# Patient Record
Sex: FEMALE | Race: BLACK OR AFRICAN AMERICAN | Employment: STUDENT | URBAN - METROPOLITAN AREA
[De-identification: names, ages, dates, MRNs, and addresses within clinical notes are randomized per-mention and may not be internally consistent; named-entity substitution may affect disease eponyms.]

---

## 2018-05-21 ENCOUNTER — HOSPITAL ENCOUNTER (OUTPATIENT)
Dept: GENERAL RADIOLOGY | Age: 23
Discharge: HOME | End: 2018-05-21
Payer: MEDICAID

## 2018-05-21 DIAGNOSIS — M25.562 LEFT KNEE PAIN: ICD-10-CM

## 2018-05-21 DIAGNOSIS — M25.561 RIGHT KNEE PAIN: ICD-10-CM

## 2018-05-21 PROCEDURE — 73560 X-RAY EXAM OF KNEE 1 OR 2: CPT

## 2021-07-09 ENCOUNTER — VIRTUAL VISIT (OUTPATIENT)
Dept: NEUROLOGY | Age: 26
End: 2021-07-09
Payer: MEDICAID

## 2021-07-09 DIAGNOSIS — R56.9 SEIZURE-LIKE ACTIVITY (HCC): ICD-10-CM

## 2021-07-09 DIAGNOSIS — R40.20 LOC (LOSS OF CONSCIOUSNESS) (HCC): ICD-10-CM

## 2021-07-09 DIAGNOSIS — Z91.89 AT HIGH RISK FOR INADEQUATE NUTRITIONAL INTAKE: ICD-10-CM

## 2021-07-09 DIAGNOSIS — R40.20 LOC (LOSS OF CONSCIOUSNESS) (HCC): Primary | ICD-10-CM

## 2021-07-09 PROCEDURE — 99204 OFFICE O/P NEW MOD 45 MIN: CPT | Performed by: NURSE PRACTITIONER

## 2021-07-09 NOTE — PROGRESS NOTES
Early Fox presents today for   Chief Complaint   Patient presents with    Dizziness       Is someone accompanying this pt? Virtual visit [phone]    Is the patient using any DME equipment during OV? no    Depression Screening:  No flowsheet data found. Learning Assessment:  No flowsheet data found. Abuse Screening:  No flowsheet data found. Fall Risk  No flowsheet data found. Coordination of Care:  1. Have you been to the ER, urgent care clinic since your last visit? Hospitalized since your last visit? no    2. Have you seen or consulted any other health care providers outside of the [address] [place] [address] since your last visit? Include any pap smears or colon screening.  no
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
## 2021-07-12 ENCOUNTER — TELEPHONE (OUTPATIENT)
Dept: NEUROLOGY | Age: 26
End: 2021-07-12

## 2021-07-13 NOTE — TELEPHONE ENCOUNTER
----- Message from Dwayne Chandler NP sent at 7/13/2021  9:16 AM EDT -----  Regarding: awaiting testing  I spoke to the neurologist and confirmed the plan that she must have her MRI and EEG complete in order to consider clearing her for sports. Thank you.

## 2021-07-13 NOTE — TELEPHONE ENCOUNTER
Attempted to contact patient regarding patient to complete testing prior clearing for sports. No answer. Left message to .

## 2021-07-20 ENCOUNTER — OFFICE VISIT (OUTPATIENT)
Dept: NEUROLOGY | Age: 26
End: 2021-07-20

## 2023-07-31 ENCOUNTER — APPOINTMENT (OUTPATIENT)
Facility: HOSPITAL | Age: 28
End: 2023-07-31
Payer: COMMERCIAL

## 2023-07-31 ENCOUNTER — HOSPITAL ENCOUNTER (EMERGENCY)
Facility: HOSPITAL | Age: 28
Discharge: HOME | End: 2023-07-31
Attending: STUDENT IN AN ORGANIZED HEALTH CARE EDUCATION/TRAINING PROGRAM
Payer: COMMERCIAL

## 2023-07-31 ENCOUNTER — TELEPHONE (OUTPATIENT)
Age: 28
End: 2023-07-31

## 2023-07-31 VITALS
DIASTOLIC BLOOD PRESSURE: 66 MMHG | TEMPERATURE: 98.3 F | RESPIRATION RATE: 18 BRPM | SYSTOLIC BLOOD PRESSURE: 118 MMHG | WEIGHT: 200 LBS | BODY MASS INDEX: 27.09 KG/M2 | HEIGHT: 72 IN | OXYGEN SATURATION: 100 % | HEART RATE: 57 BPM

## 2023-07-31 DIAGNOSIS — M79.604 RIGHT LEG PAIN: Primary | ICD-10-CM

## 2023-07-31 LAB
ECHO BSA: 2.23 M2
GLUCOSE BLD STRIP.AUTO-MCNC: 96 MG/DL (ref 70–110)

## 2023-07-31 PROCEDURE — 73630 X-RAY EXAM OF FOOT: CPT

## 2023-07-31 PROCEDURE — 99284 EMERGENCY DEPT VISIT MOD MDM: CPT

## 2023-07-31 PROCEDURE — 93971 EXTREMITY STUDY: CPT

## 2023-07-31 PROCEDURE — 82962 GLUCOSE BLOOD TEST: CPT

## 2023-07-31 ASSESSMENT — PAIN DESCRIPTION - ORIENTATION: ORIENTATION: RIGHT

## 2023-07-31 ASSESSMENT — ENCOUNTER SYMPTOMS
DIARRHEA: 0
SORE THROAT: 0
SHORTNESS OF BREATH: 0
NAUSEA: 0
EYE DISCHARGE: 0
VOMITING: 0
WHEEZING: 0
ABDOMINAL DISTENTION: 0

## 2023-07-31 ASSESSMENT — PAIN DESCRIPTION - FREQUENCY: FREQUENCY: INTERMITTENT

## 2023-07-31 ASSESSMENT — PAIN DESCRIPTION - DESCRIPTORS: DESCRIPTORS: ACHING

## 2023-07-31 ASSESSMENT — PAIN DESCRIPTION - PAIN TYPE: TYPE: ACUTE PAIN

## 2023-07-31 ASSESSMENT — PAIN - FUNCTIONAL ASSESSMENT: PAIN_FUNCTIONAL_ASSESSMENT: 0-10

## 2023-07-31 ASSESSMENT — PAIN DESCRIPTION - LOCATION: LOCATION: LEG

## 2023-07-31 NOTE — ED PROVIDER NOTES
EMERGENCY DEPARTMENT HISTORY AND PHYSICAL EXAM    Date: 7/31/2023  Patient Name: Andriy Morocho    History of Presenting Illness     Chief Complaint   Patient presents with    Leg Pain    Foot Swelling         History Provided By: Patient and mother      Additional History (Context): Andriy Morocho is a 24 y.o. female with a history of asthma who presents today for right lower extremity pain and swelling as well as foot pain and swelling. Patient denies known cause for this. Denies any recent trauma or injury. 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 Denies history of DVT or PE. Patient also reports that her primary care doctor said she was at risk for diabetes and she is wondering if her swelling could be from this. Is not currently on any diabetes treatment at this time. Denies any erythema or wounds. Denies any prior injuries or surgeries to this leg or foot      PCP: None None    No current facility-administered medications for this encounter. No current outpatient medications on file. Past History     Past Medical History:  Past Medical History:   Diagnosis Date    Asthma        Past Surgical History:  No past surgical history on file. Family History:  No family history on file. Social History:  Social History     Tobacco Use    Smoking status: Never    Smokeless tobacco: Never   Substance Use Topics    Alcohol use: No    Drug use: No       Allergies:  No Known Allergies      Review of Systems   Review of Systems   Constitutional:  Negative for chills, diaphoresis and fever. HENT:  Negative for congestion and sore throat. Eyes:  Negative for discharge. Respiratory:  Negative for shortness of breath and wheezing. Cardiovascular:  Negative for chest pain. Gastrointestinal:  Negative for abdominal distention, diarrhea, nausea and vomiting. Genitourinary:  Negative for dysuria and urgency.    Musculoskeletal:  Positive for

## 2023-07-31 NOTE — ED TRIAGE NOTES
Patient A/O x 4, presented to the ED with complaint of right leg pain, foot swelling x today. Patient denies trauma.

## 2023-07-31 NOTE — TELEPHONE ENCOUNTER
Patient was seen at [facility] ED today for rt leg and right foot pain/swelling. They did xrays and vascular and advised her to follow up with us. Please review ED notes and advise patient of our recommendations for appointment, [phone].

## 2023-10-26 ENCOUNTER — HOSPITAL ENCOUNTER (EMERGENCY)
Facility: HOSPITAL | Age: 28
Discharge: HOME | End: 2023-10-26
Attending: EMERGENCY MEDICINE
Payer: COMMERCIAL

## 2023-10-26 ENCOUNTER — APPOINTMENT (OUTPATIENT)
Facility: HOSPITAL | Age: 28
End: 2023-10-26
Payer: COMMERCIAL

## 2023-10-26 VITALS
HEART RATE: 58 BPM | OXYGEN SATURATION: 99 % | RESPIRATION RATE: 20 BRPM | SYSTOLIC BLOOD PRESSURE: 112 MMHG | BODY MASS INDEX: 27.77 KG/M2 | DIASTOLIC BLOOD PRESSURE: 71 MMHG | WEIGHT: 205 LBS | TEMPERATURE: 99.1 F | HEIGHT: 72 IN
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
## 2023-10-26 DIAGNOSIS — S86.911A KNEE STRAIN, RIGHT, INITIAL ENCOUNTER: Primary | ICD-10-CM

## 2023-10-26 DIAGNOSIS — S93.601A SPRAIN OF RIGHT FOOT, INITIAL ENCOUNTER: ICD-10-CM

## 2023-10-26 PROCEDURE — 73620 X-RAY EXAM OF FOOT: CPT

## 2023-10-26 PROCEDURE — 99283 EMERGENCY DEPT VISIT LOW MDM: CPT

## 2023-10-26 PROCEDURE — 73562 X-RAY EXAM OF KNEE 3: CPT

## 2023-10-26 RX ORDER — NAPROXEN 500 MG/1
500 TABLET ORAL 2 TIMES DAILY
Qty: 60 TABLET | Refills: 0 | Status: SHIPPED | OUTPATIENT
Start: 2023-10-26

## 2023-10-26 ASSESSMENT — ENCOUNTER SYMPTOMS
SORE THROAT: 0
VOMITING: 0
EYE DISCHARGE: 0
RHINORRHEA: 0
NAUSEA: 0
ABDOMINAL PAIN: 0
CONSTIPATION: 0
EYE REDNESS: 0
SHORTNESS OF BREATH: 0
BACK PAIN: 0
DIARRHEA: 0
COUGH: 0
WHEEZING: 0

## 2023-10-26 ASSESSMENT — PAIN SCALES - GENERAL: PAINLEVEL_OUTOF10: 8

## 2023-10-26 ASSESSMENT — PAIN - FUNCTIONAL ASSESSMENT: PAIN_FUNCTIONAL_ASSESSMENT: 0-10

## 2023-10-26 NOTE — ED TRIAGE NOTES
Pt states she messed up her right knee playing basketball 3 weeks ago. Also c/o right foot pain for 2 months. Has been seen her before for same.  Pt states she wants an MRI

## 2023-10-26 NOTE — ED PROVIDER NOTES
assessment completed. REASSESSMENT        CONSULTS:  None    DISCHARGE NOTE:  6:16 PM  Katherine Hendrix's  results have been reviewed with her. She has been counseled regarding her diagnosis, treatment, and plan. She verbally conveys understanding and agreement of the signs, symptoms, diagnosis, treatment and prognosis and additionally agrees to follow up as discussed. She also agrees with the care-plan and conveys that all of her questions have been answered. I have also provided discharge instructions for her that include: educational information regarding their diagnosis and treatment, and list of reasons why they would want to return to the ED prior to their follow-up appointment, should her condition change. FINAL IMPRESSION      1. Knee strain, right, initial encounter    2. Sprain of right foot, initial encounter    3. Fall on same level, initial encounter            DISPOSITION/PLAN   DISPOSITION Decision To Discharge 10/26/2023 06:14:37 PM       Medication List        START taking these medications      naproxen 500 MG tablet  Commonly known as: Naprosyn  Take 1 tablet by mouth 2 times daily            ASK your doctor about these medications      NONFORMULARY               Where to Get Your Medications        These medications were sent to [facility] [id number], [address] [place] [address], [address] [place] [address],Third Floor      Phone: [phone]   naproxen 500 MG tablet          PATIENT REFERRED TO:  [address]  [address]  [phone]  Go in 1 week  As needed    Chacorta Murillo MD  [address]  [phone]    Go in 2 weeks  If no improvement.       DISCHARGE MEDICATIONS:  Discharge Medication List as of 10/26/2023  6:16 PM        START taking these medications    Details   naproxen (NAPROSYN) 500 MG tablet Take 1 tablet by mouth 2 times daily,

## 2023-10-30 ENCOUNTER — OFFICE VISIT (OUTPATIENT)
Age: 28
End: 2023-10-30
Payer: COMMERCIAL

## 2023-10-30 VITALS — BODY MASS INDEX: 27.77 KG/M2 | WEIGHT: 205 LBS | HEIGHT: 72 IN

## 2023-10-30 DIAGNOSIS — S86.911A KNEE STRAIN, RIGHT, INITIAL ENCOUNTER: ICD-10-CM

## 2023-10-30 DIAGNOSIS — S83.8X1A SPRAIN OF OTHER LIGAMENT OF RIGHT KNEE, INITIAL ENCOUNTER: Primary | ICD-10-CM

## 2023-10-30 DIAGNOSIS — M79.671 RIGHT FOOT PAIN: ICD-10-CM

## 2023-10-30 PROCEDURE — 99203 OFFICE O/P NEW LOW 30 MIN: CPT | Performed by: ORTHOPAEDIC SURGERY

## 2023-10-30 SDOH — HEALTH STABILITY: PHYSICAL HEALTH: ON AVERAGE, HOW MANY DAYS PER WEEK DO YOU ENGAGE IN MODERATE TO STRENUOUS EXERCISE (LIKE A BRISK WALK)?: 6 DAYS

## 2023-10-30 NOTE — PATIENT INSTRUCTIONS
If we order a Diagnostic test (such as MRI or CT) during your office visit please see below:     Coordination of Care will be calling you to schedule your diagnostic test. If you have not heard from Coordination of Care within 2 business days, please call [phone]. Once you have a date scheduled for your diagnostic test, you will need to contact our office to schedule a follow up appointment about 4 days following the exam, as this is when the physician will review your diagnostic test results with you. You can contact our office to schedule appointment by phone at [phone], or you can send a message via [facility] to request an appointment.

## 2025-03-17 ENCOUNTER — APPOINTMENT (OUTPATIENT)
Facility: HOSPITAL | Age: 30
End: 2025-03-17
Payer: COMMERCIAL

## 2025-03-17 ENCOUNTER — HOSPITAL ENCOUNTER (INPATIENT)
Facility: HOSPITAL | Age: 30
LOS: 4 days | Discharge: HOME | End: 2025-03-21
Attending: EMERGENCY MEDICINE | Admitting: INTERNAL MEDICINE
Payer: COMMERCIAL

## 2025-03-17 ENCOUNTER — PROCEDURE VISIT (OUTPATIENT)
Age: 30
End: 2025-03-17

## 2025-03-17 DIAGNOSIS — R56.9 SEIZURE-LIKE ACTIVITY (HCC): Primary | ICD-10-CM

## 2025-03-17 DIAGNOSIS — F44.5 PSYCHOSOMATIC SEIZURE: Primary | ICD-10-CM

## 2025-03-17 PROBLEM — G40.901 STATUS EPILEPTICUS (HCC): Status: ACTIVE | Noted: 2025-03-17

## 2025-03-17 LAB
ALBUMIN SERPL-MCNC: 3.7 G/DL (ref 3.4–5)
ALBUMIN/GLOB SERPL: 0.9 (ref 0.8–1.7)
ALP SERPL-CCNC: 83 U/L (ref 45–117)
ALT SERPL-CCNC: 15 U/L (ref 13–56)
ANION GAP SERPL CALC-SCNC: 6 MMOL/L (ref 3–18)
APPEARANCE UR: CLEAR
ARTERIAL PATENCY WRIST A: POSITIVE
AST SERPL-CCNC: 23 U/L (ref 10–38)
BASE DEFICIT BLD-SCNC: 1.9 MMOL/L
BASOPHILS # BLD: 0.03 K/UL (ref 0–0.1)
BASOPHILS NFR BLD: 0.6 % (ref 0–2)
BDY SITE: ABNORMAL
BILIRUB SERPL-MCNC: 0.7 MG/DL (ref 0.2–1)
BILIRUB UR QL: NEGATIVE
BUN SERPL-MCNC: 11 MG/DL (ref 7–18)
BUN/CREAT SERPL: 11 (ref 12–20)
CALCIUM SERPL-MCNC: 9.3 MG/DL (ref 8.5–10.1)
CHLORIDE SERPL-SCNC: 106 MMOL/L (ref 100–111)
CO2 SERPL-SCNC: 25 MMOL/L (ref 21–32)
COLOR UR: YELLOW
CREAT SERPL-MCNC: 1.02 MG/DL (ref 0.6–1.3)
DIFFERENTIAL METHOD BLD: ABNORMAL
EOSINOPHIL # BLD: 0.07 K/UL (ref 0–0.4)
EOSINOPHIL NFR BLD: 1.4 % (ref 0–5)
ERYTHROCYTE [DISTWIDTH] IN BLOOD BY AUTOMATED COUNT: 14.4 % (ref 11.6–14.5)
GAS FLOW.O2 O2 DELIVERY SYS: ABNORMAL
GAS FLOW.O2 SETTING OXYMISER: 14 BPM
GLOBULIN SER CALC-MCNC: 3.9 G/DL (ref 2–4)
GLUCOSE SERPL-MCNC: 88 MG/DL (ref 74–99)
GLUCOSE UR STRIP.AUTO-MCNC: NEGATIVE MG/DL
HCG SERPL-ACNC: <1 MIU/ML (ref 0–10)
HCO3 BLD-SCNC: 24.1 MMOL/L (ref 21–28)
HCT VFR BLD AUTO: 35.2 % (ref 35–45)
HGB BLD-MCNC: 11.5 G/DL (ref 12–16)
HGB UR QL STRIP: NEGATIVE
IMM GRANULOCYTES # BLD AUTO: 0.01 K/UL (ref 0–0.04)
IMM GRANULOCYTES NFR BLD AUTO: 0.2 % (ref 0–0.5)
KETONES UR QL STRIP.AUTO: NEGATIVE MG/DL
LACTATE BLD-SCNC: 0.85 MMOL/L (ref 0.4–2)
LEUKOCYTE ESTERASE UR QL STRIP.AUTO: NEGATIVE
LYMPHOCYTES # BLD: 1.35 K/UL (ref 0.9–3.6)
LYMPHOCYTES NFR BLD: 27.9 % (ref 21–52)
MCH RBC QN AUTO: 26 PG (ref 24–34)
MCHC RBC AUTO-ENTMCNC: 32.7 G/DL (ref 31–37)
MCV RBC AUTO: 79.6 FL (ref 78–100)
MONOCYTES # BLD: 0.44 K/UL (ref 0.05–1.2)
MONOCYTES NFR BLD: 9.1 % (ref 3–10)
NEUTS SEG # BLD: 2.94 K/UL (ref 1.8–8)
NEUTS SEG NFR BLD: 60.8 % (ref 40–73)
NITRITE UR QL STRIP.AUTO: NEGATIVE
NRBC # BLD: 0 K/UL (ref 0–0.01)
NRBC BLD-RTO: 0 PER 100 WBC
O2/TOTAL GAS SETTING VFR VENT: 50 %
PCO2 BLD: 44.5 MMHG (ref 35–48)
PEEP RESPIRATORY: 5 CMH2O
PH BLD: 7.34 (ref 7.35–7.45)
PH UR STRIP: 6.5 (ref 5–8)
PLATELET # BLD AUTO: 279 K/UL (ref 135–420)
PMV BLD AUTO: 10 FL (ref 9.2–11.8)
PO2 BLD: 214 MMHG (ref 83–108)
POTASSIUM SERPL-SCNC: 4.1 MMOL/L (ref 3.5–5.5)
PROT SERPL-MCNC: 7.6 G/DL (ref 6.4–8.2)
PROT UR STRIP-MCNC: NEGATIVE MG/DL
RBC # BLD AUTO: 4.42 M/UL (ref 4.2–5.3)
RESPIRATORY RATE, POC: 14 (ref 5–40)
SAO2 % BLD: 99.7 % (ref 92–97)
SERVICE CMNT-IMP: ABNORMAL
SODIUM SERPL-SCNC: 137 MMOL/L (ref 136–145)
SP GR UR REFRACTOMETRY: 1.02 (ref 1–1.03)
SPECIMEN TYPE: ABNORMAL
UROBILINOGEN UR QL STRIP.AUTO: 0.2 EU/DL (ref 0.2–1)
VENTILATION MODE VENT: ABNORMAL
VT SETTING VENT: 450 ML
WBC # BLD AUTO: 4.8 K/UL (ref 4.6–13.2)

## 2025-03-17 PROCEDURE — 6360000004 HC RX CONTRAST MEDICATION: Performed by: EMERGENCY MEDICINE

## 2025-03-17 PROCEDURE — 87086 URINE CULTURE/COLONY COUNT: CPT

## 2025-03-17 PROCEDURE — 84100 ASSAY OF PHOSPHORUS: CPT

## 2025-03-17 PROCEDURE — 94761 N-INVAS EAR/PLS OXIMETRY MLT: CPT

## 2025-03-17 PROCEDURE — 5A1935Z RESPIRATORY VENTILATION, LESS THAN 24 CONSECUTIVE HOURS: ICD-10-PCS | Performed by: EMERGENCY MEDICINE

## 2025-03-17 PROCEDURE — 2720000010 HC SURG SUPPLY STERILE

## 2025-03-17 PROCEDURE — 6360000002 HC RX W HCPCS

## 2025-03-17 PROCEDURE — 84443 ASSAY THYROID STIM HORMONE: CPT

## 2025-03-17 PROCEDURE — 80307 DRUG TEST PRSMV CHEM ANLYZR: CPT

## 2025-03-17 PROCEDURE — 83605 ASSAY OF LACTIC ACID: CPT

## 2025-03-17 PROCEDURE — 0202U NFCT DS 22 TRGT SARS-COV-2: CPT

## 2025-03-17 PROCEDURE — 80179 DRUG ASSAY SALICYLATE: CPT

## 2025-03-17 PROCEDURE — 2700000000 HC OXYGEN THERAPY PER DAY

## 2025-03-17 PROCEDURE — 96376 TX/PRO/DX INJ SAME DRUG ADON: CPT

## 2025-03-17 PROCEDURE — 84702 CHORIONIC GONADOTROPIN TEST: CPT

## 2025-03-17 PROCEDURE — 0BH17EZ INSERTION OF ENDOTRACHEAL AIRWAY INTO TRACHEA, VIA NATURAL OR ARTIFICIAL OPENING: ICD-10-PCS | Performed by: EMERGENCY MEDICINE

## 2025-03-17 PROCEDURE — 80053 COMPREHEN METABOLIC PANEL: CPT

## 2025-03-17 PROCEDURE — 84145 PROCALCITONIN (PCT): CPT

## 2025-03-17 PROCEDURE — 93005 ELECTROCARDIOGRAM TRACING: CPT | Performed by: STUDENT IN AN ORGANIZED HEALTH CARE EDUCATION/TRAINING PROGRAM

## 2025-03-17 PROCEDURE — 85025 COMPLETE CBC W/AUTO DIFF WBC: CPT

## 2025-03-17 PROCEDURE — 70496 CT ANGIOGRAPHY HEAD: CPT

## 2025-03-17 PROCEDURE — 71045 X-RAY EXAM CHEST 1 VIEW: CPT

## 2025-03-17 PROCEDURE — 1100000000 HC RM PRIVATE

## 2025-03-17 PROCEDURE — 51702 INSERT TEMP BLADDER CATH: CPT

## 2025-03-17 PROCEDURE — 31500 INSERT EMERGENCY AIRWAY: CPT

## 2025-03-17 PROCEDURE — 96374 THER/PROPH/DIAG INJ IV PUSH: CPT

## 2025-03-17 PROCEDURE — 36600 WITHDRAWAL OF ARTERIAL BLOOD: CPT

## 2025-03-17 PROCEDURE — 82077 ASSAY SPEC XCP UR&BREATH IA: CPT

## 2025-03-17 PROCEDURE — 81003 URINALYSIS AUTO W/O SCOPE: CPT

## 2025-03-17 PROCEDURE — 2500000003 HC RX 250 WO HCPCS: Performed by: EMERGENCY MEDICINE

## 2025-03-17 PROCEDURE — 70450 CT HEAD/BRAIN W/O DYE: CPT

## 2025-03-17 PROCEDURE — 82550 ASSAY OF CK (CPK): CPT

## 2025-03-17 PROCEDURE — 82803 BLOOD GASES ANY COMBINATION: CPT

## 2025-03-17 PROCEDURE — 80143 DRUG ASSAY ACETAMINOPHEN: CPT

## 2025-03-17 PROCEDURE — 99292 CRITICAL CARE ADDL 30 MIN: CPT

## 2025-03-17 PROCEDURE — 80320 DRUG SCREEN QUANTALCOHOLS: CPT

## 2025-03-17 PROCEDURE — 95706 EEG WO VID 2-12HR INTMT MNTR: CPT

## 2025-03-17 PROCEDURE — 87040 BLOOD CULTURE FOR BACTERIA: CPT

## 2025-03-17 PROCEDURE — 84439 ASSAY OF FREE THYROXINE: CPT

## 2025-03-17 PROCEDURE — 96375 TX/PRO/DX INJ NEW DRUG ADDON: CPT

## 2025-03-17 PROCEDURE — 83735 ASSAY OF MAGNESIUM: CPT

## 2025-03-17 PROCEDURE — 6360000002 HC RX W HCPCS: Performed by: EMERGENCY MEDICINE

## 2025-03-17 PROCEDURE — 99291 CRITICAL CARE FIRST HOUR: CPT

## 2025-03-17 PROCEDURE — 94002 VENT MGMT INPAT INIT DAY: CPT

## 2025-03-17 RX ORDER — POTASSIUM CHLORIDE 29.8 MG/ML
20 INJECTION INTRAVENOUS PRN
Status: DISCONTINUED | OUTPATIENT
Start: 2025-03-17 | End: 2025-03-21 | Stop reason: HOSPADM

## 2025-03-17 RX ORDER — SODIUM CHLORIDE 0.9 % (FLUSH) 0.9 %
5-40 SYRINGE (ML) INJECTION EVERY 12 HOURS SCHEDULED
Status: DISCONTINUED | OUTPATIENT
Start: 2025-03-18 | End: 2025-03-21 | Stop reason: HOSPADM

## 2025-03-17 RX ORDER — IOPAMIDOL 755 MG/ML
100 INJECTION, SOLUTION INTRAVASCULAR
Status: COMPLETED | OUTPATIENT
Start: 2025-03-17 | End: 2025-03-17

## 2025-03-17 RX ORDER — ENOXAPARIN SODIUM 100 MG/ML
40 INJECTION SUBCUTANEOUS DAILY
Status: DISCONTINUED | OUTPATIENT
Start: 2025-03-18 | End: 2025-03-21 | Stop reason: HOSPADM

## 2025-03-17 RX ORDER — MINERAL OIL AND WHITE PETROLATUM 150; 830 MG/G; MG/G
OINTMENT OPHTHALMIC EVERY 4 HOURS
Status: DISCONTINUED | OUTPATIENT
Start: 2025-03-18 | End: 2025-03-17

## 2025-03-17 RX ORDER — SODIUM CHLORIDE 0.9 % (FLUSH) 0.9 %
5-40 SYRINGE (ML) INJECTION PRN
Status: DISCONTINUED | OUTPATIENT
Start: 2025-03-17 | End: 2025-03-21 | Stop reason: HOSPADM

## 2025-03-17 RX ORDER — DIAZEPAM 10 MG/2ML
INJECTION, SOLUTION INTRAMUSCULAR; INTRAVENOUS
Status: COMPLETED
Start: 2025-03-17 | End: 2025-03-17

## 2025-03-17 RX ORDER — PROPOFOL 10 MG/ML
5-50 INJECTION, EMULSION INTRAVENOUS CONTINUOUS
OUTPATIENT
Start: 2025-03-18

## 2025-03-17 RX ORDER — POLYETHYLENE GLYCOL 3350 17 G/17G
17 POWDER, FOR SOLUTION ORAL DAILY PRN
Status: DISCONTINUED | OUTPATIENT
Start: 2025-03-17 | End: 2025-03-21 | Stop reason: HOSPADM

## 2025-03-17 RX ORDER — MAGNESIUM SULFATE IN WATER 40 MG/ML
2000 INJECTION, SOLUTION INTRAVENOUS PRN
Status: DISCONTINUED | OUTPATIENT
Start: 2025-03-17 | End: 2025-03-21 | Stop reason: HOSPADM

## 2025-03-17 RX ORDER — MIDAZOLAM IN NACL,ISO-OSMOT/PF 50 MG/50ML
1-10 INFUSION BOTTLE (ML) INTRAVENOUS CONTINUOUS
Status: DISCONTINUED | OUTPATIENT
Start: 2025-03-18 | End: 2025-03-18

## 2025-03-17 RX ORDER — FENTANYL CITRATE-0.9 % NACL/PF 10 MCG/ML
25-200 PLASTIC BAG, INJECTION (ML) INTRAVENOUS CONTINUOUS
Refills: 0 | Status: DISCONTINUED | OUTPATIENT
Start: 2025-03-17 | End: 2025-03-17

## 2025-03-17 RX ORDER — ROCURONIUM BROMIDE 10 MG/ML
100 INJECTION, SOLUTION INTRAVENOUS
Status: COMPLETED | OUTPATIENT
Start: 2025-03-17 | End: 2025-03-17

## 2025-03-17 RX ORDER — ONDANSETRON 4 MG/1
4 TABLET, ORALLY DISINTEGRATING ORAL EVERY 8 HOURS PRN
Status: DISCONTINUED | OUTPATIENT
Start: 2025-03-17 | End: 2025-03-21 | Stop reason: HOSPADM

## 2025-03-17 RX ORDER — CHLORHEXIDINE GLUCONATE ORAL RINSE 1.2 MG/ML
15 SOLUTION DENTAL 2 TIMES DAILY
Status: DISCONTINUED | OUTPATIENT
Start: 2025-03-18 | End: 2025-03-18

## 2025-03-17 RX ORDER — ONDANSETRON 2 MG/ML
4 INJECTION INTRAMUSCULAR; INTRAVENOUS EVERY 6 HOURS PRN
Status: DISCONTINUED | OUTPATIENT
Start: 2025-03-17 | End: 2025-03-21 | Stop reason: HOSPADM

## 2025-03-17 RX ORDER — MIDAZOLAM HYDROCHLORIDE 1 MG/ML
5 INJECTION, SOLUTION INTRAMUSCULAR; INTRAVENOUS
Status: DISCONTINUED | OUTPATIENT
Start: 2025-03-17 | End: 2025-03-18

## 2025-03-17 RX ORDER — DIAZEPAM 10 MG/2ML
10 INJECTION, SOLUTION INTRAMUSCULAR; INTRAVENOUS
Status: COMPLETED | OUTPATIENT
Start: 2025-03-17 | End: 2025-03-17

## 2025-03-17 RX ORDER — LEVETIRACETAM 500 MG/5ML
500 INJECTION, SOLUTION, CONCENTRATE INTRAVENOUS EVERY 12 HOURS
Status: DISCONTINUED | OUTPATIENT
Start: 2025-03-18 | End: 2025-03-18

## 2025-03-17 RX ORDER — IPRATROPIUM BROMIDE AND ALBUTEROL SULFATE 2.5; .5 MG/3ML; MG/3ML
1 SOLUTION RESPIRATORY (INHALATION) EVERY 4 HOURS PRN
Status: DISCONTINUED | OUTPATIENT
Start: 2025-03-17 | End: 2025-03-21 | Stop reason: HOSPADM

## 2025-03-17 RX ORDER — DIAZEPAM 10 MG/2ML
10 INJECTION, SOLUTION INTRAMUSCULAR; INTRAVENOUS ONCE
Status: COMPLETED | OUTPATIENT
Start: 2025-03-17 | End: 2025-03-17

## 2025-03-17 RX ORDER — POLYVINYL ALCOHOL 14 MG/ML
1 SOLUTION/ DROPS OPHTHALMIC EVERY 4 HOURS
Status: DISCONTINUED | OUTPATIENT
Start: 2025-03-18 | End: 2025-03-17

## 2025-03-17 RX ORDER — POTASSIUM CHLORIDE 7.45 MG/ML
10 INJECTION INTRAVENOUS PRN
Status: DISCONTINUED | OUTPATIENT
Start: 2025-03-17 | End: 2025-03-21 | Stop reason: HOSPADM

## 2025-03-17 RX ORDER — LORAZEPAM 2 MG/ML
4 INJECTION INTRAMUSCULAR PRN
Status: DISCONTINUED | OUTPATIENT
Start: 2025-03-17 | End: 2025-03-17 | Stop reason: ALTCHOICE

## 2025-03-17 RX ORDER — ETOMIDATE 2 MG/ML
30 INJECTION INTRAVENOUS
Status: COMPLETED | OUTPATIENT
Start: 2025-03-17 | End: 2025-03-17

## 2025-03-17 RX ORDER — PROPOFOL 10 MG/ML
5-50 INJECTION, EMULSION INTRAVENOUS CONTINUOUS
Status: DISCONTINUED | OUTPATIENT
Start: 2025-03-18 | End: 2025-03-17

## 2025-03-17 RX ORDER — ACETAMINOPHEN 325 MG/1
650 TABLET ORAL
Status: DISPENSED | OUTPATIENT
Start: 2025-03-17 | End: 2025-03-18

## 2025-03-17 RX ORDER — LEVETIRACETAM 500 MG/5ML
1500 INJECTION, SOLUTION, CONCENTRATE INTRAVENOUS
Status: COMPLETED | OUTPATIENT
Start: 2025-03-17 | End: 2025-03-17

## 2025-03-17 RX ORDER — SODIUM CHLORIDE 9 MG/ML
INJECTION, SOLUTION INTRAVENOUS PRN
Status: DISCONTINUED | OUTPATIENT
Start: 2025-03-17 | End: 2025-03-21 | Stop reason: HOSPADM

## 2025-03-17 RX ORDER — IPRATROPIUM BROMIDE AND ALBUTEROL SULFATE 2.5; .5 MG/3ML; MG/3ML
1 SOLUTION RESPIRATORY (INHALATION)
Status: DISCONTINUED | OUTPATIENT
Start: 2025-03-18 | End: 2025-03-17

## 2025-03-17 RX ADMIN — PROPOFOL 20 MCG/KG/MIN: 10 INJECTION, EMULSION INTRAVENOUS at 23:51

## 2025-03-17 RX ADMIN — DIAZEPAM 10 MG: 5 INJECTION, SOLUTION INTRAMUSCULAR; INTRAVENOUS at 21:58

## 2025-03-17 RX ADMIN — LEVETIRACETAM 1500 MG: 100 INJECTION INTRAVENOUS at 20:23

## 2025-03-17 RX ADMIN — ETOMIDATE 30 MG: 2 INJECTION, SOLUTION INTRAVENOUS at 23:20

## 2025-03-17 RX ADMIN — DIAZEPAM 10 MG: 5 INJECTION, SOLUTION INTRAMUSCULAR; INTRAVENOUS at 20:55

## 2025-03-17 RX ADMIN — ROCURONIUM BROMIDE 100 MG: 10 INJECTION, SOLUTION INTRAVENOUS at 23:20

## 2025-03-17 RX ADMIN — IOPAMIDOL 87 ML: 755 INJECTION, SOLUTION INTRAVENOUS at 21:43

## 2025-03-17 RX ADMIN — Medication 50 MCG/HR: at 23:38

## 2025-03-17 RX ADMIN — DIAZEPAM 10 MG: 10 INJECTION, SOLUTION INTRAMUSCULAR; INTRAVENOUS at 21:58

## 2025-03-17 ASSESSMENT — PAIN SCALES - GENERAL: PAINLEVEL_OUTOF10: 10

## 2025-03-17 ASSESSMENT — PAIN DESCRIPTION - DESCRIPTORS: DESCRIPTORS: DISCOMFORT

## 2025-03-17 ASSESSMENT — PAIN - FUNCTIONAL ASSESSMENT: PAIN_FUNCTIONAL_ASSESSMENT: 0-10

## 2025-03-17 ASSESSMENT — PULMONARY FUNCTION TESTS: PIF_VALUE: 14

## 2025-03-17 ASSESSMENT — PAIN DESCRIPTION - LOCATION: LOCATION: HEAD;ABDOMEN

## 2025-03-17 ASSESSMENT — PAIN DESCRIPTION - ORIENTATION: ORIENTATION: LEFT;RIGHT

## 2025-03-18 ENCOUNTER — APPOINTMENT (OUTPATIENT)
Facility: HOSPITAL | Age: 30
End: 2025-03-18
Payer: COMMERCIAL

## 2025-03-18 ENCOUNTER — PROCEDURE VISIT (OUTPATIENT)
Age: 30
End: 2025-03-18

## 2025-03-18 DIAGNOSIS — F44.5 PSYCHOSOMATIC SEIZURE: Primary | ICD-10-CM

## 2025-03-18 PROBLEM — J96.01 ACUTE RESPIRATORY FAILURE WITH HYPOXIA: Status: ACTIVE | Noted: 2025-03-18

## 2025-03-18 LAB
ACETONE BLOOD: NORMAL MG/L
AMPHET UR QL SCN: NEGATIVE
ANION GAP SERPL CALC-SCNC: 8 MMOL/L (ref 3–18)
APAP SERPL-MCNC: <2 UG/ML (ref 10–30)
ARTERIAL PATENCY WRIST A: ABNORMAL
ARTERIAL PATENCY WRIST A: POSITIVE
B PERT DNA SPEC QL NAA+PROBE: NOT DETECTED
BARBITURATES UR QL SCN: NEGATIVE
BASE DEFICIT BLD-SCNC: 1.8 MMOL/L
BASE DEFICIT BLD-SCNC: 2.2 MMOL/L
BASOPHILS # BLD: 0.04 K/UL (ref 0–0.1)
BASOPHILS NFR BLD: 0.7 % (ref 0–2)
BDY SITE: ABNORMAL
BDY SITE: ABNORMAL
BENZODIAZ UR QL: POSITIVE
BORDETELLA PARAPERTUSSIS BY PCR: NOT DETECTED
BUN SERPL-MCNC: 11 MG/DL (ref 7–18)
BUN/CREAT SERPL: 11 (ref 12–20)
C PNEUM DNA SPEC QL NAA+PROBE: NOT DETECTED
CALCIUM SERPL-MCNC: 8.9 MG/DL (ref 8.5–10.1)
CANNABINOIDS UR QL SCN: NEGATIVE
CHAIN OF CUSTODY?: NO
CHLORIDE SERPL-SCNC: 104 MMOL/L (ref 100–111)
CK SERPL-CCNC: 73 U/L (ref 26–192)
CO2 SERPL-SCNC: 24 MMOL/L (ref 21–32)
COCAINE UR QL SCN: NEGATIVE
CREAT SERPL-MCNC: 1.02 MG/DL (ref 0.6–1.3)
DIFFERENTIAL METHOD BLD: ABNORMAL
EOSINOPHIL # BLD: 0.03 K/UL (ref 0–0.4)
EOSINOPHIL NFR BLD: 0.6 % (ref 0–5)
ERYTHROCYTE [DISTWIDTH] IN BLOOD BY AUTOMATED COUNT: 14.4 % (ref 11.6–14.5)
ETHANOL SERPL-MCNC: <3 MG/DL (ref 0–3)
ETHANOL: <10 MG/L (ref 0–0.08)
ETHYLENE GLYCOL: NORMAL MG/L
FENTANYL: NEGATIVE
FLUAV SUBTYP SPEC NAA+PROBE: NOT DETECTED
FLUBV RNA SPEC QL NAA+PROBE: NOT DETECTED
GAS FLOW.O2 O2 DELIVERY SYS: ABNORMAL
GAS FLOW.O2 O2 DELIVERY SYS: ABNORMAL
GAS FLOW.O2 SETTING OXYMISER: 14 BPM
GAS FLOW.O2 SETTING OXYMISER: 14 BPM
GLUCOSE BLD STRIP.AUTO-MCNC: 93 MG/DL (ref 70–110)
GLUCOSE SERPL-MCNC: 109 MG/DL (ref 74–99)
HADV DNA SPEC QL NAA+PROBE: NOT DETECTED
HCO3 BLD-SCNC: 23.7 MMOL/L (ref 21–28)
HCO3 BLD-SCNC: 25.2 MMOL/L (ref 21–28)
HCOV 229E RNA SPEC QL NAA+PROBE: NOT DETECTED
HCOV HKU1 RNA SPEC QL NAA+PROBE: NOT DETECTED
HCOV NL63 RNA SPEC QL NAA+PROBE: NOT DETECTED
HCOV OC43 RNA SPEC QL NAA+PROBE: NOT DETECTED
HCT VFR BLD AUTO: 34.8 % (ref 35–45)
HGB BLD-MCNC: 11.1 G/DL (ref 12–16)
HMPV RNA SPEC QL NAA+PROBE: NOT DETECTED
HPIV1 RNA SPEC QL NAA+PROBE: NOT DETECTED
HPIV2 RNA SPEC QL NAA+PROBE: NOT DETECTED
HPIV3 RNA SPEC QL NAA+PROBE: NOT DETECTED
HPIV4 RNA SPEC QL NAA+PROBE: NOT DETECTED
IMM GRANULOCYTES # BLD AUTO: 0.02 K/UL (ref 0–0.04)
IMM GRANULOCYTES NFR BLD AUTO: 0.4 % (ref 0–0.5)
IPAP/PIP/HIGH PEEP: 10
IPAP/PIP/HIGH PEEP: 14
ISOPROPANOL: NORMAL MG/L
LYMPHOCYTES # BLD: 1.01 K/UL (ref 0.9–3.6)
LYMPHOCYTES NFR BLD: 18.6 % (ref 21–52)
Lab: ABNORMAL
Lab: NORMAL
M PNEUMO DNA SPEC QL NAA+PROBE: NOT DETECTED
MAGNESIUM SERPL-MCNC: 1.8 MG/DL (ref 1.6–2.6)
MAGNESIUM SERPL-MCNC: 4.1 MG/DL (ref 1.6–2.6)
MCH RBC QN AUTO: 25.8 PG (ref 24–34)
MCHC RBC AUTO-ENTMCNC: 31.9 G/DL (ref 31–37)
MCV RBC AUTO: 80.7 FL (ref 78–100)
METHADONE UR QL: NEGATIVE
METHANOL: NORMAL MG/L
MONOCYTES # BLD: 0.51 K/UL (ref 0.05–1.2)
MONOCYTES NFR BLD: 9.4 % (ref 3–10)
NEUTS SEG # BLD: 3.82 K/UL (ref 1.8–8)
NEUTS SEG NFR BLD: 70.3 % (ref 40–73)
NRBC # BLD: 0 K/UL (ref 0–0.01)
NRBC BLD-RTO: 0 PER 100 WBC
O2/TOTAL GAS SETTING VFR VENT: 30 %
O2/TOTAL GAS SETTING VFR VENT: 30 %
OPIATES UR QL: NEGATIVE
OXYCODONE UR QL SCN: NEGATIVE
PCO2 BLD: 42.3 MMHG (ref 35–48)
PCO2 BLD: 53.1 MMHG (ref 35–48)
PCP UR QL: NEGATIVE
PEEP RESPIRATORY: 5 CMH2O
PEEP RESPIRATORY: 5 CMH2O
PH BLD: 7.28 (ref 7.35–7.45)
PH BLD: 7.36 (ref 7.35–7.45)
PHOSPHATE SERPL-MCNC: 4 MG/DL (ref 2.5–4.9)
PHOSPHATE SERPL-MCNC: 4.9 MG/DL (ref 2.5–4.9)
PLATELET # BLD AUTO: 275 K/UL (ref 135–420)
PMV BLD AUTO: 11.1 FL (ref 9.2–11.8)
PO2 BLD: 134 MMHG (ref 83–108)
PO2 BLD: 34 MMHG (ref 83–108)
POTASSIUM SERPL-SCNC: 3.4 MMOL/L (ref 3.5–5.5)
POTASSIUM SERPL-SCNC: 3.7 MMOL/L (ref 3.5–5.5)
PROCALCITONIN SERPL-MCNC: <0.05 NG/ML
RBC # BLD AUTO: 4.31 M/UL (ref 4.2–5.3)
REPORT STATUS: NORMAL
RESPIRATORY RATE, POC: 14 (ref 5–40)
RESPIRATORY RATE, POC: 15 (ref 5–40)
RSV RNA SPEC QL NAA+PROBE: NOT DETECTED
RV+EV RNA SPEC QL NAA+PROBE: NOT DETECTED
SALICYLATES SERPL-MCNC: <1.7 MG/DL (ref 2.8–20)
SAO2 % BLD: 57.8 % (ref 92–97)
SAO2 % BLD: 98.9 % (ref 92–97)
SARS-COV-2 RNA RESP QL NAA+PROBE: NOT DETECTED
SERVICE CMNT-IMP: ABNORMAL
SERVICE CMNT-IMP: ABNORMAL
SODIUM SERPL-SCNC: 136 MMOL/L (ref 136–145)
SPECIMEN SOURCE: NORMAL
SPECIMEN TYPE: ABNORMAL
SPECIMEN TYPE: ABNORMAL
T4 FREE SERPL-MCNC: 1.3 NG/DL (ref 0.7–1.5)
TROPONIN I SERPL HS-MCNC: 3 NG/L (ref 0–54)
TSH SERPL DL<=0.05 MIU/L-ACNC: 5 UIU/ML (ref 0.36–3.74)
VENTILATION MODE VENT: ABNORMAL
VENTILATION MODE VENT: ABNORMAL
VT SETTING VENT: 450 ML
VT SETTING VENT: 450 ML
WBC # BLD AUTO: 5.4 K/UL (ref 4.6–13.2)

## 2025-03-18 PROCEDURE — 6370000000 HC RX 637 (ALT 250 FOR IP)

## 2025-03-18 PROCEDURE — APPSS45 APP SPLIT SHARED TIME 31-45 MINUTES

## 2025-03-18 PROCEDURE — 94640 AIRWAY INHALATION TREATMENT: CPT

## 2025-03-18 PROCEDURE — 6360000002 HC RX W HCPCS

## 2025-03-18 PROCEDURE — 95706 EEG WO VID 2-12HR INTMT MNTR: CPT

## 2025-03-18 PROCEDURE — 71045 X-RAY EXAM CHEST 1 VIEW: CPT

## 2025-03-18 PROCEDURE — 85025 COMPLETE CBC W/AUTO DIFF WBC: CPT

## 2025-03-18 PROCEDURE — 99291 CRITICAL CARE FIRST HOUR: CPT | Performed by: INTERNAL MEDICINE

## 2025-03-18 PROCEDURE — 6360000002 HC RX W HCPCS: Performed by: NURSE PRACTITIONER

## 2025-03-18 PROCEDURE — 80048 BASIC METABOLIC PNL TOTAL CA: CPT

## 2025-03-18 PROCEDURE — 6360000002 HC RX W HCPCS: Performed by: PHYSICIAN ASSISTANT

## 2025-03-18 PROCEDURE — 2580000003 HC RX 258

## 2025-03-18 PROCEDURE — 82803 BLOOD GASES ANY COMBINATION: CPT

## 2025-03-18 PROCEDURE — 84132 ASSAY OF SERUM POTASSIUM: CPT

## 2025-03-18 PROCEDURE — 99291 CRITICAL CARE FIRST HOUR: CPT | Performed by: PSYCHIATRY & NEUROLOGY

## 2025-03-18 PROCEDURE — 94003 VENT MGMT INPAT SUBQ DAY: CPT

## 2025-03-18 PROCEDURE — 2700000000 HC OXYGEN THERAPY PER DAY

## 2025-03-18 PROCEDURE — XX20X89 MONITORING OF BRAIN ELECTRICAL ACTIVITY, COMPUTER-AIDED DETECTION AND NOTIFICATION, NEW TECHNOLOGY GROUP 9: ICD-10-PCS | Performed by: PSYCHIATRY & NEUROLOGY

## 2025-03-18 PROCEDURE — 93005 ELECTROCARDIOGRAM TRACING: CPT | Performed by: INTERNAL MEDICINE

## 2025-03-18 PROCEDURE — 51702 INSERT TEMP BLADDER CATH: CPT

## 2025-03-18 PROCEDURE — 89220 SPUTUM SPECIMEN COLLECTION: CPT

## 2025-03-18 PROCEDURE — 84484 ASSAY OF TROPONIN QUANT: CPT

## 2025-03-18 PROCEDURE — 82962 GLUCOSE BLOOD TEST: CPT

## 2025-03-18 PROCEDURE — 87070 CULTURE OTHR SPECIMN AEROBIC: CPT

## 2025-03-18 PROCEDURE — 2500000003 HC RX 250 WO HCPCS

## 2025-03-18 PROCEDURE — 87205 SMEAR GRAM STAIN: CPT

## 2025-03-18 PROCEDURE — 83735 ASSAY OF MAGNESIUM: CPT

## 2025-03-18 PROCEDURE — 2000000000 HC ICU R&B

## 2025-03-18 PROCEDURE — 36600 WITHDRAWAL OF ARTERIAL BLOOD: CPT

## 2025-03-18 PROCEDURE — P9047 ALBUMIN (HUMAN), 25%, 50ML: HCPCS | Performed by: NURSE PRACTITIONER

## 2025-03-18 PROCEDURE — 6370000000 HC RX 637 (ALT 250 FOR IP): Performed by: PHYSICIAN ASSISTANT

## 2025-03-18 PROCEDURE — 36415 COLL VENOUS BLD VENIPUNCTURE: CPT

## 2025-03-18 RX ORDER — POTASSIUM CHLORIDE 7.45 MG/ML
10 INJECTION INTRAVENOUS
Status: COMPLETED | OUTPATIENT
Start: 2025-03-18 | End: 2025-03-18

## 2025-03-18 RX ORDER — ALBUMIN (HUMAN) 12.5 G/50ML
25 SOLUTION INTRAVENOUS ONCE
Status: COMPLETED | OUTPATIENT
Start: 2025-03-18 | End: 2025-03-18

## 2025-03-18 RX ORDER — PROCHLORPERAZINE EDISYLATE 5 MG/ML
2.5 INJECTION INTRAMUSCULAR; INTRAVENOUS
Status: ACTIVE | OUTPATIENT
Start: 2025-03-18 | End: 2025-03-19

## 2025-03-18 RX ORDER — MORPHINE SULFATE 2 MG/ML
2 INJECTION, SOLUTION INTRAMUSCULAR; INTRAVENOUS ONCE
Status: COMPLETED | OUTPATIENT
Start: 2025-03-18 | End: 2025-03-18

## 2025-03-18 RX ORDER — ACETAMINOPHEN 160 MG/5ML
650 LIQUID ORAL EVERY 6 HOURS PRN
Status: DISCONTINUED | OUTPATIENT
Start: 2025-03-18 | End: 2025-03-19

## 2025-03-18 RX ORDER — FENTANYL CITRATE 50 UG/ML
INJECTION, SOLUTION INTRAMUSCULAR; INTRAVENOUS
Status: COMPLETED
Start: 2025-03-18 | End: 2025-03-18

## 2025-03-18 RX ORDER — PROPOFOL 10 MG/ML
5-50 INJECTION, EMULSION INTRAVENOUS CONTINUOUS
Status: DISCONTINUED | OUTPATIENT
Start: 2025-03-18 | End: 2025-03-18

## 2025-03-18 RX ORDER — FENTANYL CITRATE 50 UG/ML
100 INJECTION, SOLUTION INTRAMUSCULAR; INTRAVENOUS
Refills: 0 | Status: DISCONTINUED | OUTPATIENT
Start: 2025-03-18 | End: 2025-03-18

## 2025-03-18 RX ORDER — SODIUM CHLORIDE FOR INHALATION 0.9 %
3 VIAL, NEBULIZER (ML) INHALATION EVERY 4 HOURS PRN
Status: DISCONTINUED | OUTPATIENT
Start: 2025-03-18 | End: 2025-03-21 | Stop reason: HOSPADM

## 2025-03-18 RX ORDER — DEXMEDETOMIDINE HYDROCHLORIDE 4 UG/ML
.1-1.5 INJECTION, SOLUTION INTRAVENOUS CONTINUOUS
Status: DISCONTINUED | OUTPATIENT
Start: 2025-03-18 | End: 2025-03-18

## 2025-03-18 RX ORDER — MIDAZOLAM HYDROCHLORIDE 1 MG/ML
2 INJECTION, SOLUTION INTRAMUSCULAR; INTRAVENOUS ONCE
Status: COMPLETED | OUTPATIENT
Start: 2025-03-18 | End: 2025-03-18

## 2025-03-18 RX ORDER — ALBUTEROL SULFATE 0.83 MG/ML
2.5 SOLUTION RESPIRATORY (INHALATION) ONCE
Status: COMPLETED | OUTPATIENT
Start: 2025-03-18 | End: 2025-03-18

## 2025-03-18 RX ORDER — LAMOTRIGINE 25 MG/1
25 TABLET ORAL DAILY
Status: DISCONTINUED | OUTPATIENT
Start: 2025-03-18 | End: 2025-03-20

## 2025-03-18 RX ORDER — MAGNESIUM SULFATE IN WATER 40 MG/ML
2000 INJECTION, SOLUTION INTRAVENOUS ONCE
Status: COMPLETED | OUTPATIENT
Start: 2025-03-18 | End: 2025-03-18

## 2025-03-18 RX ORDER — PROMETHAZINE HYDROCHLORIDE 25 MG/ML
6.25 INJECTION, SOLUTION INTRAMUSCULAR; INTRAVENOUS
Status: DISCONTINUED | OUTPATIENT
Start: 2025-03-18 | End: 2025-03-18 | Stop reason: ALTCHOICE

## 2025-03-18 RX ORDER — MIDAZOLAM HYDROCHLORIDE 1 MG/ML
5 INJECTION, SOLUTION INTRAMUSCULAR; INTRAVENOUS
Status: DISCONTINUED | OUTPATIENT
Start: 2025-03-18 | End: 2025-03-18

## 2025-03-18 RX ADMIN — FAMOTIDINE 20 MG: 10 INJECTION, SOLUTION INTRAVENOUS at 07:56

## 2025-03-18 RX ADMIN — POTASSIUM CHLORIDE 10 MEQ: 7.46 INJECTION, SOLUTION INTRAVENOUS at 07:56

## 2025-03-18 RX ADMIN — POTASSIUM CHLORIDE 10 MEQ: 7.46 INJECTION, SOLUTION INTRAVENOUS at 05:43

## 2025-03-18 RX ADMIN — DEXMEDETOMIDINE HYDROCHLORIDE 0.2 MCG/KG/HR: 4 INJECTION, SOLUTION INTRAVENOUS at 05:06

## 2025-03-18 RX ADMIN — POLYVINYL ALCOHOL, POVIDONE 1 DROP: 14; 6 SOLUTION/ DROPS OPHTHALMIC at 01:47

## 2025-03-18 RX ADMIN — LEVETIRACETAM 500 MG: 100 INJECTION INTRAVENOUS at 00:28

## 2025-03-18 RX ADMIN — FAMOTIDINE 20 MG: 10 INJECTION, SOLUTION INTRAVENOUS at 01:46

## 2025-03-18 RX ADMIN — FENTANYL CITRATE 100 MCG: 0.05 INJECTION, SOLUTION INTRAMUSCULAR; INTRAVENOUS at 05:02

## 2025-03-18 RX ADMIN — POLYVINYL ALCOHOL, POVIDONE 1 DROP: 14; 6 SOLUTION/ DROPS OPHTHALMIC at 08:00

## 2025-03-18 RX ADMIN — FAMOTIDINE 20 MG: 10 INJECTION, SOLUTION INTRAVENOUS at 22:42

## 2025-03-18 RX ADMIN — ALBUTEROL SULFATE 2.5 MG: 2.5 SOLUTION RESPIRATORY (INHALATION) at 12:00

## 2025-03-18 RX ADMIN — POLYVINYL ALCOHOL, POVIDONE 1 DROP: 14; 6 SOLUTION/ DROPS OPHTHALMIC at 11:46

## 2025-03-18 RX ADMIN — RACEPINEPHRINE HYDROCHLORIDE 11.25 MG: 11.25 SOLUTION RESPIRATORY (INHALATION) at 12:00

## 2025-03-18 RX ADMIN — CHLORHEXIDINE GLUCONATE 15 ML: 1.2 SOLUTION ORAL at 01:46

## 2025-03-18 RX ADMIN — FENTANYL CITRATE 100 MCG: 0.05 INJECTION, SOLUTION INTRAMUSCULAR; INTRAVENOUS at 01:26

## 2025-03-18 RX ADMIN — SODIUM CHLORIDE, PRESERVATIVE FREE 10 ML: 5 INJECTION INTRAVENOUS at 22:43

## 2025-03-18 RX ADMIN — ONDANSETRON 4 MG: 2 INJECTION, SOLUTION INTRAMUSCULAR; INTRAVENOUS at 10:53

## 2025-03-18 RX ADMIN — LEVETIRACETAM 500 MG: 100 INJECTION INTRAVENOUS at 10:17

## 2025-03-18 RX ADMIN — PROPOFOL 40 MCG/KG/MIN: 10 INJECTION, EMULSION INTRAVENOUS at 02:56

## 2025-03-18 RX ADMIN — FENTANYL CITRATE 100 MCG: 0.05 INJECTION, SOLUTION INTRAMUSCULAR; INTRAVENOUS at 01:00

## 2025-03-18 RX ADMIN — ALBUMIN (HUMAN) 25 G: 0.25 INJECTION, SOLUTION INTRAVENOUS at 06:52

## 2025-03-18 RX ADMIN — PROPOFOL 35 MCG/KG/MIN: 10 INJECTION, EMULSION INTRAVENOUS at 06:55

## 2025-03-18 RX ADMIN — MAGNESIUM SULFATE HEPTAHYDRATE 2000 MG: 40 INJECTION, SOLUTION INTRAVENOUS at 01:50

## 2025-03-18 RX ADMIN — SODIUM CHLORIDE, PRESERVATIVE FREE 10 ML: 5 INJECTION INTRAVENOUS at 07:59

## 2025-03-18 RX ADMIN — FENTANYL CITRATE 100 MCG: 0.05 INJECTION, SOLUTION INTRAMUSCULAR; INTRAVENOUS at 05:31

## 2025-03-18 RX ADMIN — MORPHINE SULFATE 2 MG: 2 INJECTION, SOLUTION INTRAMUSCULAR; INTRAVENOUS at 23:30

## 2025-03-18 RX ADMIN — POLYVINYL ALCOHOL, POVIDONE 1 DROP: 14; 6 SOLUTION/ DROPS OPHTHALMIC at 17:04

## 2025-03-18 RX ADMIN — ACETAMINOPHEN 650 MG: 650 SOLUTION ORAL at 22:42

## 2025-03-18 RX ADMIN — ACETAMINOPHEN 650 MG: 650 SOLUTION ORAL at 10:12

## 2025-03-18 RX ADMIN — POTASSIUM CHLORIDE 10 MEQ: 7.46 INJECTION, SOLUTION INTRAVENOUS at 06:53

## 2025-03-18 RX ADMIN — POTASSIUM CHLORIDE 10 MEQ: 7.46 INJECTION, SOLUTION INTRAVENOUS at 04:54

## 2025-03-18 RX ADMIN — MIDAZOLAM 2 MG: 1 INJECTION INTRAMUSCULAR; INTRAVENOUS at 03:22

## 2025-03-18 RX ADMIN — POLYVINYL ALCOHOL, POVIDONE 1 DROP: 14; 6 SOLUTION/ DROPS OPHTHALMIC at 05:46

## 2025-03-18 RX ADMIN — CHLORHEXIDINE GLUCONATE 15 ML: 1.2 SOLUTION ORAL at 07:55

## 2025-03-18 RX ADMIN — ENOXAPARIN SODIUM 40 MG: 100 INJECTION SUBCUTANEOUS at 07:55

## 2025-03-18 RX ADMIN — Medication 2 MG/HR: at 00:28

## 2025-03-18 RX ADMIN — MIDAZOLAM 5 MG: 1 INJECTION INTRAMUSCULAR; INTRAVENOUS at 00:33

## 2025-03-18 ASSESSMENT — PAIN SCALES - GENERAL
PAINLEVEL_OUTOF10: 0
PAINLEVEL_OUTOF10: 0
PAINLEVEL_OUTOF10: 8

## 2025-03-18 ASSESSMENT — PULMONARY FUNCTION TESTS
PIF_VALUE: 15
PIF_VALUE: 17

## 2025-03-18 ASSESSMENT — PAIN SCALES - WONG BAKER: WONGBAKER_NUMERICALRESPONSE: NO HURT

## 2025-03-18 NOTE — CONSULTS
Comprehensive Nutrition Assessment    Type and Reason for Visit:  Initial, Consult, NPO/clear liquid    Nutrition Recommendations/Plan:   Advance PO diet as tolerated when medically feasible.  Daily wts.  Continue to monitor readiness for diet, weight, labs, and plan of care during admission.      Malnutrition Assessment:  Malnutrition Status:  At risk for malnutrition (s/p extubation) (03/18/25 1144)    Context:  Acute Illness       Nutrition History and Allergies:      Past Medical History:   Diagnosis Date    Anxiety     Asthma    Spoke to mother and twin on phone. States pts intake was normal, no decrease in appetite PTA, consumes 1-3 meals/day, but will sometimes fast during the day. A few days after her overseas trip, she had a decrease in appetite. Confirmed pts height of 6'6\".     Weight Hx: States  lb, stable, denies recent wt changes. Bed scale taken this date 97.3 kg. Current charted wt is stable. Wt stable per family report.   Wt Readings from Last 10 Encounters:   03/18/25 97.3 kg (214 lb 8.1 oz)   10/30/23 93 kg (205 lb)   10/26/23 93 kg (205 lb)   07/31/23 90.7 kg (200 lb)     NFPE: limited visual NFPE conducted only. Food Allergies: NKFA    Nutrition Assessment:    Admitted for seizures. Per H&P: pt c/o multiple episodes of emesis, abd pain for a few days PTA. Intubated for airway protection 3/17. Pt seen for - Consult: TF ordering and management. Discussed care during interdisciplinary rounds. Per RN, pt had OGT to suction; weaning down sedation. Pt extubated shortly after rounds. TF consult no longer appropriate. Continue to follow for diet advancement.    Nutrition Related Findings:    Pertinent Meds:   Lovenox  Pepcid  Keppra  KCl 10 mEq  Prop (held) Pertinent Labs:  Recent Labs     03/17/25  2020 03/17/25  2357 03/18/25  0203   GLUCOSE 88  --  109*   BUN 11  --  11   CREATININE 1.02  --  1.02     --  136   K 4.1  --  3.4*     --  104   CO2 25  --  24   CALCIUM 9.3  --  8.9

## 2025-03-18 NOTE — ED NOTES
[address]   [address]     2323 ET 25@lip  2323 Color change 100% spo2     2328 OG tube 57@Lip

## 2025-03-18 NOTE — PROGRESS NOTES
03/18/25 0259   ETT    Placement Date/Time: 03/17/25 3006   Present on Admission/Arrival: No  Placed By: In ED  Placement Verified By: Chest X-ray  Preoxygenation: Yes  Mask Ventilation: Ventilated by mask (1)  Technique: Video laryngoscopy  Airway Type: Cuffed  Airway Tube...   Secured At 27 cm  (per Damaris ARTEAGA)     ETT advanced from 25cm to 27cm at the lip per NP.

## 2025-03-18 NOTE — ED NOTES
Pt seizing on stretcher, MD made aware. Pt allergies verified, pt medicated per MAR. Mother at bedside.

## 2025-03-18 NOTE — PROGRESS NOTES
Pt extubated to 2LNC  (+)BLBS with no stridor noted.  HR 63 RR19 SpO2 100%  Will continue to monitor respiratory status

## 2025-03-18 NOTE — PROGRESS NOTES
Spiritual Health History and Assessment/Progress Note  [facility]    Spiritual/Emotional Needs,  ,  ,      Name: Katherine Hendrix MRN: [id number]    Age: 29 y.o.     Sex: female   Language: English   Mosque: Evangelical   Status epilepticus (HCC)     Date: 3/18/2025            Total Time Calculated: 10 min              Spiritual Assessment began in [facility] 3 INTENSIVE CARE UNIT        Referral/Consult From: Rounding   Encounter Overview/Reason: Spiritual/Emotional Needs  Service Provided For: Patient and family together    Mena, Belief, Meaning:   Patient identifies as spiritual and has beliefs or practices that help with coping during difficult times  Family/Friends identify as spiritual and have beliefs or practices that help with coping during difficult times      Importance and Influence:  Patient has spiritual/personal beliefs that influence decisions regarding their health  Family/Friends have spiritual/personal beliefs that influence decisions regarding the patient's health    Community:  Patient feels well-supported. Support system includes: Parent/s and Extended family  Family/Friends feel well-supported. Support system includes: Children    Assessment and Plan of Care:     Patient Interventions include: Facilitated expression of thoughts and feelings, Affirmed coping skills/support systems, and Provided sacramental/Lutheran ritual  Family/Friends Interventions include: Facilitated expression of thoughts and feelings and Provided sacramental/Lutheran ritual    Patient Plan of Care: Spiritual Care available upon further referral  Family/Friends Plan of Care: Spiritual Care available upon further referral    Electronically signed by Chaplain Lyubov on 3/18/2025 at 12:39 PM

## 2025-03-18 NOTE — ED NOTES
New rider inserted 16Fr 10 ml ballon Sterile technique used. Urine sample obtained from port at time of insertion and sent to lab draining to dependant drainage bag. Free off kinks and loops. Pt tolerated well.

## 2025-03-18 NOTE — PLAN OF CARE
Problem: Safety - Medical Restraint  Goal: Remains free of injury from restraints (Restraint for Interference with Medical Device)  Description: INTERVENTIONS:  1. Determine that other, less restrictive measures have been tried or would not be effective before applying the restraint  2. Evaluate the patient's condition at the time of restraint application  3. Inform patient/family regarding the reason for restraint  4. Q2H: Monitor safety, psychosocial status, comfort, nutrition and hydration  3/18/2025 0744 by Hand, CHANELLE Gordillo  Outcome: Progressing  Flowsheets (Taken 3/18/2025 0600)  Remains free of injury from restraints (restraint for interference with medical device): Every 2 hours: Monitor safety, psychosocial status, comfort, nutrition and hydration  3/18/2025 0744 by Hand, CHANELLE Gordillo  Outcome: Progressing  3/18/2025 0744 by Hand, CHANELLE Gordillo  Outcome: Progressing  Flowsheets  Taken 3/18/2025 0600  Remains free of injury from restraints (restraint for interference with medical device): Every 2 hours: Monitor safety, psychosocial status, comfort, nutrition and hydration  Taken 3/18/2025 0400  Remains free of injury from restraints (restraint for interference with medical device): Every 2 hours: Monitor safety, psychosocial status, comfort, nutrition and hydration  Taken 3/18/2025 0200  Remains free of injury from restraints (restraint for interference with medical device): Every 2 hours: Monitor safety, psychosocial status, comfort, nutrition and hydration  Taken 3/18/2025 0000  Remains free of injury from restraints (restraint for interference with medical device): Every 2 hours: Monitor safety, psychosocial status, comfort, nutrition and hydration  Taken 3/17/2025 2358  Remains free of injury from restraints (restraint for interference with medical device):   Determine that other, less restrictive measures have been tried or would not be effective before applying the restraint   Evaluate the patient's condition  pain and evaluate response   Implement non-pharmacological measures as appropriate and evaluate response   Consider cultural and social influences on pain and pain management   Notify Licensed Independent Practitioner if interventions unsuccessful or patient reports new pain  3/18/2025 0744 by Rand Ulrich RN  Outcome: Progressing  3/18/2025 0744 by Rand Ulrich RN  Outcome: Progressing  Flowsheets (Taken 3/18/2025 0744)  Verbalizes/displays adequate comfort level or baseline comfort level:   Encourage patient to monitor pain and request assistance   Assess pain using appropriate pain scale   Administer analgesics based on type and severity of pain and evaluate response   Implement non-pharmacological measures as appropriate and evaluate response   Consider cultural and social influences on pain and pain management   Notify Licensed Independent Practitioner if interventions unsuccessful or patient reports new pain     Problem: Safety - Adult  Goal: Free from fall injury  3/18/2025 0744 by Rand Ulrich RN  Outcome: Progressing  Flowsheets (Taken 3/18/2025 0744)  Free From Fall Injury:   Instruct family/caregiver on patient safety   Based on caregiver fall risk screen, instruct family/caregiver to ask for assistance with transferring infant if caregiver noted to have fall risk factors  3/18/2025 0744 by Rand Ulrich RN  Outcome: Progressing  3/18/2025 0744 by Rand Ulrich RN  Outcome: Progressing  Flowsheets (Taken 3/18/2025 0744)  Free From Fall Injury:   Instruct family/caregiver on patient safety   Based on caregiver fall risk screen, instruct family/caregiver to ask for assistance with transferring infant if caregiver noted to have fall risk factors     Problem: Neurosensory - Adult  Goal: Absence of seizures  3/18/2025 0744 by Rand Ulrich RN  Outcome: Progressing  Flowsheets (Taken 3/18/2025 0744)  Absence of seizures:   Monitor for seizure activity.  If seizure occurs, document type and location of movements  with activity based on assessment

## 2025-03-18 NOTE — ED TRIAGE NOTES
Pt arrives via EMS from home cc witnessed seizure.    Pt's family witnessed a seizure at home, EMS also witnessed a second seizure en route.   5 mg of Versed given intranasally by EMS.     Pt has hx of seizures, not currently taking medications for seizures.     Pt is alert and oriented at triage.

## 2025-03-18 NOTE — H&P
[facility]  Pulmonary, Critical Care, and Sleep Medicine    Name: Katherine Hendrix MRN: [id number]   : 1995 Hospital: [facility]   Date: 3/18/2025        Critical Care History and Physical      IMPRESSION:   Status Epilepticus vs Pseudoseizures? - Neuro exam unrevealing . Pt did appear to be shaking her legs and head in a restless motion, but was completely alert and oriented, following commands, never postictal, and no grand mal seizure effect.      - CT/CTA Head negative   Intubated for airway protection   Hx of Anxiety  Hx of Suicide attempt - 2019  Hx of OD on Zoloft   Hx of Asthma      Patient Active Problem List   Diagnosis    Status epilepticus (HCC)        RECOMMENDATIONS:   Neuro: Titrate sedation to RASS of 0 to -1. PRN for breakthrough sedation needs.   - CT/CTA head negative  - Keppra. Versed PRN for seizures   - Sedation: Propofol & Precedex. PRN Fentanyl. S/p Versed gtt in the ED   - q4h neuro checks  - Consult Neurology    Pulm: Titrate FiO2 for goal SPO2> 90%,VAP prevention bundle, head of the bed at 30' all times.   Daily sedation holiday and assessment for weaning with SBT as tolerated.    PRN duonebs.     CVS: Keep MAP >65mmHg.     GI: SUP, Trend LFTs, Zofran PRN for N/V, Diet/NPO    Renal:  Trend Renal indices, Strict Is/Os, Cook (+)  CK negative     Hem/Onc: Monitor for s/o active bleeding. On Lovenox     I/D: Sepsis bundle per hospital protocol, Blood, Sputum, and Urine cultures drawn and will be followed.   Lactic acid ordered- initial and repeat Q4hrs till normalized.   Antibiotics: None. Trend WBCs and temperature curve.    Endocrine: Q6 glucoses, SSI. TSH level pending    Metabolic:  Daily BMP, mag, phos. Trend lytes, replace as needed.     Musc/Skin: no acute issues, wound care    Toxicity:  UDS, Fentanyl, Oxy, EToH, Volatile, ASA, Acetaminophen - all negative    GYN: HCG negative 25    Palliative: Full Code No additional code details,  Mother states she recently traveled to [place] a month ago for a basketball tournament. Patient also complained of multiple episodes of emesis associated with abdominal pain over the past few days.  Patient denied pregnancy & states she is approximately 3 days into her menstrual period and she does have a history of heavy menstrual periods.  Also complaining of urinary frequency.   Labs were insignificant. LA negative, Ck negative, Procal negative, UDS negative, pregnancy test negative. TSH elevated, Free T4 WNL.       Upon my evaluation of the patient, she was intubated, lightly sedated, able to answer questions. Neuro exam unrevealing . Pt did appear to be shaking her legs and head in a restless motion, but was completely alter, never postictal, and no grand mal seizure effect.        The patient is critically ill and can not provide additional history due to Ventilated.       Past Medical History:   Diagnosis Date    Anxiety     Asthma         No past surgical history on file.     Prior to Admission medications    Medication Sig Start Date End Date Taking? Authorizing Provider   NONFORMULARY 2 anxiety meds    Provider, MD Car   naproxen (NAPROSYN) 500 MG tablet Take 1 tablet by mouth 2 times daily 10/26/23   Bernadette Ortiz PA       [unfilled]    No Known Allergies     Social History     Tobacco Use    Smoking status: Never    Smokeless tobacco: Never   Substance Use Topics    Alcohol use: No        No family history on file.       Review of Systems:  Review of systems not obtained due to patient factors.    Objective:   Vital Signs:    BP (!) 153/105   Pulse 92   Temp 98.3 °F (36.8 °C) (Oral)   Resp 16   Ht 1.981 m (6' 6\")   Wt 98.9 kg (218 lb)   SpO2 100%   BMI 25.19 kg/m²             Temp (24hrs), Av.3 °F (36.8 °C), Min:98.3 °F (36.8 °C), Max:98.3 °F (36.8 °C)       Intake/Output:   Last shift:      No intake/output data recorded.  Last 3 shifts: No intake/output data recorded.  No intake or  03/17/2025    ALT 15 03/17/2025    LABGLOM 77 03/17/2025    GLOB 3.9 03/17/2025        Lab Results   Component Value Date    ALT 15 03/17/2025    AST 23 03/17/2025    ALKPHOS 83 03/17/2025    BILITOT 0.7 03/17/2025      No results found for: \"TSHFT4\", \"TSH\", \"CFU2ZAH\"   No results found for: \"LABA1C\"    No results found for: \"LABPROT\", \"LABALBU\"    Results       Procedure Component Value Units Date/Time    Culture, Blood 2 [[id number]] Collected: 03/17/25 2357    Order Status: Sent Specimen: Blood Updated: 03/18/25 0009    Culture, Blood 1 [[id number]] Collected: 03/17/25 2345    Order Status: Sent Specimen: Blood Updated: 03/18/25 0009    Respiratory Panel, Molecular, with COVID-19 (Restricted: peds pts or suitable admitted adults) [[id number]] Collected: 03/17/25 2345    Order Status: Sent Specimen: Nasopharyngeal Updated: 03/18/25 0005    Culture, Respiratory [[id number]]     Order Status: Sent Specimen: Endotracheal     Urine Culture [[id number]] Collected: 03/17/25 2335    Order Status: Sent Specimen: Urine, clean catch Updated: 03/17/25 2342              Telemetry:normal sinus rhythm, sinus laurel per her norm    Imaging:  I have personally reviewed the patient’s radiographs and have reviewed the reports:    Recent image results  XR CHEST PORTABLE  Result Date: 3/18/2025  : 1. Lines and tubes are adequate; the NG tube could be advanced an additional 10 cm. Electronically signed by Candido Torres    CTA HEAD NECK W CONTRAST  Result Date: 3/17/2025  *ATTENTION THIS IS A PRELIMINARY REPORT PLEASE FOLLOW FOR THE FINAL REPORT AS THERE MAY BE CHANGES OR ADDITIONAL PERTINENT INFORMATION* 1.  No acute findings.     CT HEAD WO CONTRAST  Result Date: 3/17/2025  1. No acute intracranial findings. Electronically signed by Shanika Lam      07/31/23    VAS DUP LOWER EXTREMITY VENOUS RIGHT 07/31/2023  4:40 PM (Final)    Interpretation Summary    No evidence of deep vein or superficial vein thrombosis in the right lower  actual

## 2025-03-18 NOTE — PROGRESS NOTES
Pharmacy Note - Therapeutic Interchange    Prochlorperazine 2.5 mg IM was therapeutically interchanged for promethazine 6.25 mg IM per the P&T Committee approved Therapeutic Interchanges Policy.    Thank you,  Amalia Mckee [facility]  3/18/2025 1:37 PM

## 2025-03-18 NOTE — PROGRESS NOTES
VENTILATOR CARE PLAN    Problem: Ventilator Management  Goal: *Adequate oxygenation/ ventilation/ and extubation      Patient:        Katherine Hendrix     29 y.o.   female     3/18/2025  5:10 AM  Patient Active Problem List   Diagnosis    Status epilepticus (HCC)       Status epilepticus (HCC) [G40.901]    Reason patient intubated: Seizure for airway protection    Ventilator day: 2    Ventilator settings: ACVC+ 14/450/5/30%    ETT Size/Placement: 7.5/ 27cm at lip     Wean Screen Pass (Yes or No):  Wean Screen Reason for Failure:  Duration of Weaning Trial:  Additional Comments: ETT advanced from 25cm to 27cm at the lip per NP.         PLAN OF CARE: Continue mechanical ventilation and SBT as tolerated       GOAL: Extubation      OUTCOME: TBD    ABG:  Date:3/18/2025   Latest Reference Range & Units 03/18/25 03:38   Set Rate bpm 14   DEVICE -   ADULT VENT   Site -   RIGHT RADIAL   Mode -   ASSIST CONTROL   IPAP/PIP/High PEEP -   10   Respiratory Rate -   15   POC pH 7.35 - 7.45   7.36   POC pCO2 35.0 - 48.0 MMHG 42.3   POC PO2 83 - 108 MMHG 134 (H)   POC HCO3 21 - 28 MMOL/L 23.7   POC O2 SAT 92 - 97 % 98.9 (H)   POC Frederick's Test -   Positive   POC TIDAL VOLUME ml 450   FIO2 % 30   POC PEEP cmH2O 5   (H): Data is abnormally high      Lab Test:  Date:3/18/2025  WBC:   Lab Results   Component Value Date/Time    WBC 5.4 03/18/2025 02:03 AM   HGB:   Lab Results   Component Value Date/Time    HGB 11.1 03/18/2025 02:03 AM    PLTS:   Lab Results   Component Value Date/Time     03/18/2025 02:03 AM         Vital Signs:   Patient Vitals for the past 8 hrs:   Temp Pulse Resp BP SpO2   03/18/25 0445 -- 71 14 -- 100 %   03/18/25 0430 97.4 °F (36.3 °C) 68 14 104/71 100 %   03/18/25 0415 -- 76 14 101/68 100 %   03/18/25 0400 -- 66 14 110/74 100 %   03/18/25 0345 -- 70 14 109/75 100 %   03/18/25 0338 -- 66 14 -- 100 %   03/18/25 0330 -- 68 14 106/72 100 %   03/18/25 0327 -- 67 14 -- 100 %   03/18/25 3395 -- 70 15 (!) 113/57 100 %    03/18/25 0300 -- 76 17 111/80 100 %   03/18/25 0259 -- 94 16 -- 100 %   03/18/25 0245 -- 84 18 115/73 100 %   03/18/25 0230 -- 63 14 102/83 100 %   03/18/25 0215 -- 64 12 96/72 100 %   03/18/25 0200 -- (!) 47 13 (!) 104/93 100 %   03/18/25 0145 -- 60 14 (!) 90/47 100 %   03/18/25 0130 -- 57 14 (!) 88/64 100 %   03/18/25 0115 -- 63 14 98/63 100 %   03/18/25 0100 96.9 °F (36.1 °C) 73 16 95/64 100 %   03/18/25 0045 -- -- -- (!) 142/104 --   03/18/25 0005 -- 92 16 (!) 153/105 100 %   03/17/25 2300 -- (!) 49 21 124/66 100 %   03/17/25 2215 -- (!) 46 20 118/69 99 %   03/17/25 2200 -- (!) 48 18 126/73 99 %

## 2025-03-18 NOTE — CONSULTS
[facility] 
 
 
 [address], [address] [facility] [address] 
[phone] Patient: Jailene Mayorga MRN: [id number] :1954 Visit Information Date & Time Provider Department Dept. Phone Encounter #  
 10/12/2018  3:30 PM Titi Apodaca MD [facility] INTERNAL MEDICINE OF Fabian John [phone] [id number] Your Appointments 10/19/2018  8:15 AM  
Follow Up with Titi Apodaca MD  
[address] [place] [address] [facility]) Appt Note: 1mo  
 [address], [address] [place] [address]  
[phone]  
  
   
 [address], [address] [facility] [address]  
  
    
 10/24/2018 10:30 AM  
Nurse Visit with [facility] [address] ([facility]) Appt Note: NP APPT [email or web address]  
 [address] [place] [address], [address] [address]  
[phone]  
  
   
 [address] [place] [address]  
  
    
 10/24/2018 11:30 AM  
New Patient with Karina Sarah MD  
[address] ([facility]) Appt Note: DR Sammi TATE(HBV ER)-ASTHMA-CXR [facility] [email or web address] Interventional MAILED  
 [address] [place] [address], [address] [address]  
[phone]  
  
   
 [address] [place] [address]  
  
    
 2018 12:15 PM  
Follow Up with Titi Apodaca MD  
[facility] INTERNAL MEDICINE OF [facility]) Appt Note: 1mo  
 [address], [address] [facility] [address]  
[phone]  
  
    
 2019  3:20 PM  
Follow Up with Kraen Coelho DO [facility] ([facility]) Appt Note: 6 month follow up [place] [address] [address] [address] [phone] [address] [place] [address] [address] Upcoming Health Maintenance Date Due Hepatitis C Screening 1954 DTaP/Tdap/Td series (1 - Tdap) 10/2/1977 Consult Note            Date:3/18/2025        Patient Name:Katherine Hendrix     YOB: 1995     Age:29 y.o.    Consult to Neurology  Consult performed by: Joy Lee MD  Consult ordered by: Mari Turner PA-C          Chief Complaint     Chief Complaint   Patient presents with    Seizures          History Obtained From   patient, mother, family member -sister    History of Present Illness   The patient is a 29-year-old female that began having seizures 5 years ago.  Apparently she was diagnosed with seizures and was on a medication that she cannot recall the name of.  Last year the seizure medications were stopped and she states that the providers told her she did not in fact have seizures.  She has been feeling unwell for 2 days with nausea vomiting diarrhea.  She had received Keppra in December 2024 for seizure activity but had ran out of that medication and has not been on it for 2 months.  She had witnessed seizure-like activity at home then she had recurrent seizure-like activity in the emergency department and was ultimately intubated for airway protection.  She was given Keppra.  She has since been extubated.  She has been awake but drowsy but without any focal neurological deficits.  She did have witnessed seizure-like activity during my assessment.  She blinked and flooded her eyes for about 1 minute.  In response to sternal rub that did stop the fluttering movement and then it recurred.    Past Medical History     Past Medical History:   Diagnosis Date    Anxiety     Asthma         Past Surgical History   No past surgical history on file.     Medications     Prior to Admission medications    Medication Sig Start Date End Date Taking? Authorizing Provider   NONFORMULARY 2 anxiety meds    Provider, MD Car   naproxen (NAPROSYN) 500 MG tablet Take 1 tablet by mouth 2 times daily 10/26/23   Bernadette Ortiz PA        fentaNYL (SUBLIMAZE) injection 100 mcg, Q15 Min  Shingrix Vaccine Age 50> (1 of 2) 10/2/2006 Influenza Age 5 to Adult 8/1/2018 PAP AKA CERVICAL CYTOLOGY 7/31/2020 BREAST CANCER SCRN MAMMOGRAM 9/18/2020 COLONOSCOPY 11/17/2027 Allergies as of 10/12/2018  Review Complete On: 10/12/2018 By: Gilles Gauthier LPN Severity Noted Reaction Type Reactions Latex  10/27/2014   Topical Rash Keflex [Cephalexin] Medium 11/16/2016    Rash Epinephrine    Other (comments)  
 tachycardia Tetracyclines    Nausea Only Current Immunizations  Reviewed on 5/8/2018 No immunizations on file. Not reviewed this visit Vitals BP Pulse Temp Height(growth percentile) SpO2 OB Status 130/60 (BP 1 Location: Left arm, BP Patient Position: Sitting) 68 98 °F (36.7 °C) (Tympanic) 4' 11\" (1.499 m) 96% Postmenopausal  
 Smoking Status Former Smoker Preferred Pharmacy Pharmacy Name Phone [facility] [address] [phone] Your Updated Medication List  
  
   
This list is accurate as of 10/12/18 11:59 PM.  Always use your most recent med list.  
  
  
  
  
 amoxicillin-clavulanate 875-125 mg per tablet Commonly known as:  AUGMENTIN  
1 tab twice per day with food  
  
 aspirin delayed-release 81 mg tablet Take  by mouth nightly. atorvastatin 40 mg tablet Commonly known as:  LIPITOR  
TAKE ONE TABLET BY MOUTH DAILY  
  
 clindamycin 300 mg capsule Commonly known as:  CLEOCIN Take 1 Cap by mouth three (3) times daily for 10 days. FLONASE 50 mcg/actuation nasal spray Generic drug:  fluticasone 2 Sprays by Both Nostrils route daily. ipratropium 42 mcg (0.06 %) nasal spray Commonly known as:  ATROVENT  
2 sprays up each nostril twice per day  
  
 labetalol 100 mg tablet Commonly known as:  Gear[place]ine [place] Take 100 mg by mouth two (2) times a day. meclizine 25 mg tablet Commonly known as:  ANTIVERT  
 1 tablet three times per day  
  
 metoprolol succinate 25 mg XL tablet Commonly known as:  TOPROL-XL  
TAKE ONE TABLET BY MOUTH DAILY  
  
 montelukast 10 mg tablet Commonly known as:  SINGULAIR Take 1 Tab by mouth daily. nitroglycerin 0.4 mg SL tablet Commonly known as:  NITROSTAT  
1 Tab by SubLINGual route every five (5) minutes as needed for Chest Pain. PROAIR HFA 90 mcg/actuation inhaler Generic drug:  albuterol ZyrTEC 10 mg tablet Generic drug:  cetirizine Take 10 mg by mouth nightly. Prescriptions Sent to Pharmacy Refills  
 ipratropium (ATROVENT) 42 mcg (0.06 %) nasal spray 2 Si sprays up each nostril twice per day Class: Normal  
 Pharmacy: [facility] Ph #: [phone]  
 montelukast (SINGULAIR) 10 mg tablet 1 Sig: Take 1 Tab by mouth daily. Class: Normal  
 Pharmacy: [address] Ph #: [phone] Route: Oral  
 clindamycin (CLEOCIN) 300 mg capsule 0 Sig: Take 1 Cap by mouth three (3) times daily for 10 days. Class: Normal  
 Pharmacy: [address] Ph #: [phone] Route: Oral  
  
Introducing [facility]! Dear Luis Rose: Thank you for requesting a [employer] account. Our records indicate that you already have an active [employer] account. You can access your account anytime at [email or web address] Did you know that you can access your hospital and ER discharge instructions at any time in [employer]? You can also review all of your test results from your hospital stay or ER visit. Additional Information If you have questions, please visit the Frequently Asked Questions section of the [employer] website at [email or web address] Remember, [employer] is NOT to be used for urgent needs. For medical emergencies, dial 911.       Plan   PN ES.  Psychogenic nonepileptiform seizures.  The patient had a witnessed typical seizure-like activity during my assessment which was consistent with bilateral eye fluttering.  In response to noxious stimuli, the fluttering stopped.  Cerebella monitoring was in place and there was no change in the electrographic background.  I had extensive discussion with the patient's mother and sister at bedside about the diagnosis.  I explained that these are not epileptiform seizures which are the ones that can cause SUDEP, respiratory failure and brain injury.  I explained that we need to help the patient deal with any underlying social or psychological contributing factors that could have contributed to this event.  She does have a history of anxiety and has not been taking anxiety medication.  The patient's mother was unsure which medication she is post to be on.  I also discussed seizure precautions including no driving.  They expressed understanding.  They ask about the patient being able to go to [facility] to play basketball.  I also recommended that the patient should not travel internationally until the symptoms are truly under control.  50 minutes of critical care time spent in evaluation.    Stop Keppra.  Start lamictal 25 mg daily.  Treatment of underlying mood disorders.  Follow up in neurology clinic in 2 weeks.  No driving until seizure free for six months.  Will sign off. Pls call with further questions.  Thank you for allowing me to participate in the care of your patient.     Electronically signed by Jyo Lee MD on 3/18/25 at 3:02 PM EDT       Now available from your iPhone and Android! Please provide this summary of care documentation to your next provider. Your primary care clinician is listed as Jeevan Yi. If you have any questions after today's visit, please call [phone].

## 2025-03-18 NOTE — ED NOTES
IV started via EMS   Sent blood work  EKG done  Connected pt to monitor   Seizure pads placed  Bed in lowest position and pt has call bell

## 2025-03-18 NOTE — ED PROVIDER NOTES
EMERGENCY DEPARTMENT HISTORY AND PHYSICAL EXAM      Date: 3/17/2025  Patient Name: Katherine Hendrix    History of Presenting Illness     Chief Complaint   Patient presents with    Seizures       History (Context): Katherine Hendrix is a 29 y.o. female  w/ pmhx of seizures presents to the hospital today for seizure.  Patient states that she has not taken her antiepileptic Medications in a while.  According to EMS patient had a witnessed seizure at home and then by the time EMS arrived they witnessed a second seizure.  Patient received 5 mg of intranasal Versed.  At the time of this evaluation patient is ANO x 3 and back to baseline.    Patient states for the last few days she has had multiple episodes of nonbloody nonbilious emesis associated with abdominal pain.  Patient states she is approximately 3 days into her period and she does have a history of heavy menstrual periods.  Also complaining of urinary frequency.    Denies fever, recent travel, chest pain, shortness of breath  Denies any other acute complaints at this time    PCP: None, None    No current facility-administered medications for this encounter.     Current Outpatient Medications   Medication Sig Dispense Refill    sertraline (ZOLOFT) 25 MG tablet Take 1 tablet by mouth daily 30 tablet 1    Vitamin D (CHOLECALCIFEROL) 25 MCG (1000 UT) TABS tablet Take 1 tablet by mouth daily 30 tablet 1       Past History     Past Medical History:   Past Medical History:   Diagnosis Date    Anxiety     Asthma        Past Surgical History:  No past surgical history on file.    Family History:  No family history on file.    Social History:   Social History     Tobacco Use    Smoking status: Never    Smokeless tobacco: Never   Substance Use Topics    Alcohol use: No    Drug use: No       Allergies:  No Known Allergies      Physical Exam     Vitals:    03/21/25 0400 03/21/25 0500 03/21/25 0600 03/21/25 0800   BP: (!) 94/57 111/71 (!) 101/56 112/74   Pulse: 53 51 (!) 47 54

## 2025-03-18 NOTE — PROGRESS NOTES
VENTILATOR CARE PLAN    Problem: Ventilator Management  Goal: *Adequate oxygenation/ ventilation/ and extubation      Patient:        Katherine Hendrix     29 y.o.   female     3/18/2025  8:56 AM  Patient Active Problem List   Diagnosis    Status epilepticus (HCC)       Status epilepticus (HCC) [G40.901]    Reason patient intubated:    Status Epilepticus vs Pseudoseizures? - Neuro exam unrevealing . Pt did appear to be shaking her legs and head in a restless motion, but was completely alert and oriented, following commands, never postictal, and no grand mal seizure effect.      - CT/CTA Head negative   Intubated for airway protection     Ventilator day: 2    Ventilator settings: AC/VC+ 14 450 +5 30%    ETT Size/Placement: 7.5 @ 27cm at lip     Wean Screen Pass (Yes or No): Fail  Wean Screen Reason for Failure: Propofol gtt, presently doing Cerebell study  Duration of Weaning Trial: N/A  Additional Comments: N/A        PLAN OF CARE: Continue to provide appropriate oxygenation and ventilation until patient can safely SBT/Wean       GOAL: Extubation      OUTCOME: TBD    ABG:  Date:3/18/2025   Latest Reference Range & Units Most Recent   CARBON DIOXIDE 21 - 32 mmol/L 24  3/18/25 02:03   Set Rate bpm 14  3/18/25 03:38   DEVICE -   ADULT VENT  3/18/25 03:38   Site -   RIGHT RADIAL  3/18/25 03:38   Mode -   ASSIST CONTROL  3/18/25 03:38   IPAP/PIP/High PEEP -   10  3/18/25 03:38   Respiratory Rate -   15  3/18/25 03:38   POC pH 7.35 - 7.45   7.36  3/18/25 03:38   POC pCO2 35.0 - 48.0 MMHG 42.3  3/18/25 03:38   POC PO2 83 - 108 MMHG 134 (H)  3/18/25 03:38   POC HCO3 21 - 28 MMOL/L 23.7  3/18/25 03:38   POC O2 SAT 92 - 97 % 98.9 (H)  3/18/25 03:38   POC Frederick's Test -   Positive  3/18/25 03:38   POC TIDAL VOLUME ml 450  3/18/25 03:38   FIO2 % 30  3/18/25 03:38   POC PEEP cmH2O 5  3/18/25 03:38   (H): Data is abnormally high    Chest X-ray:  Date:3/18/2025  STUDY:  XR CHEST PORTABLE.     HISTORY: while intubated.     COMPARISONS:  Chest radiograph 3/17/2025     TECHNIQUE:  Portable AP view(s) of the chest.     FINDINGS:      Life support tubes/lines: Endotracheal tube tip terminates approximately 2.4 cm  above the claudia. Enteric tube seen coursing the gastroesophageal junction with  tip outside the field-of-view.     Heart and mediastinum: Stable nonenlarged.     Lungs: No focal consolidation.     Pleura:  No pleural effusion. No pneumothorax.     IMPRESSION:     1.  Support apparatus as above.  2.  No definite radiographic evidence for acute cardiopulmonary process.        Electronically signed by Rosa Knutson      Lab Test:  Date:3/18/2025  WBC:   Lab Results   Component Value Date/Time    WBC 5.4 03/18/2025 02:03 AM   HGB:   Lab Results   Component Value Date/Time    HGB 11.1 03/18/2025 02:03 AM    PLTS:   Lab Results   Component Value Date/Time     03/18/2025 02:03 AM         Vital Signs:   Patient Vitals for the past 8 hrs:   Temp Pulse Resp BP SpO2   03/18/25 0700 97.3 °F (36.3 °C) 71 14 (!) 84/56 100 %   03/18/25 0645 97.3 °F (36.3 °C) 73 14 (!) 78/52 100 %   03/18/25 0630 97.5 °F (36.4 °C) 75 14 (!) 75/53 100 %   03/18/25 0615 97.5 °F (36.4 °C) 76 14 (!) 89/69 100 %   03/18/25 0600 97.7 °F (36.5 °C) 67 16 114/76 100 %   03/18/25 0545 97.9 °F (36.6 °C) 61 14 101/71 100 %   03/18/25 0530 97.9 °F (36.6 °C) 93 18 (!) 140/87 100 %   03/18/25 0515 97.7 °F (36.5 °C) 72 12 (!) 118/102 100 %   03/18/25 0500 (!) 68.4 °F (20.2 °C) 89 22 118/84 100 %   03/18/25 0445 -- 71 14 104/72 100 %   03/18/25 0430 97.4 °F (36.3 °C) 68 14 104/71 100 %   03/18/25 0415 -- 76 14 101/68 100 %   03/18/25 0400 -- 66 14 110/74 100 %   03/18/25 0345 -- 70 14 109/75 100 %   03/18/25 0338 -- 66 14 -- 100 %   03/18/25 0330 -- 68 14 106/72 100 %   03/18/25 0327 -- 67 14 -- 100 %   03/18/25 0315 -- 70 15 (!) 113/57 100 %   03/18/25 0300 -- 76 17 111/80 100 %   03/18/25 0259 -- 94 16 -- 100 %   03/18/25 0245 -- 84 18 115/73 100 %   03/18/25 0230 -- 63 14 102/83

## 2025-03-18 NOTE — ED PROVIDER NOTES
EMERGENCY DEPARTMENT HISTORY AND PHYSICAL EXAM      Date: 3/17/2025  Patient Name: Katherine Hendrix    History of Presenting Illness     Chief Complaint   Patient presents with    Seizures       History (Context): Katherine Hendrix is a 29 y.o. female  w/ pmhx of seizures presents to the hospital today for seizure.  Patient states that she has not taken her antiepileptic Medications in a while.  According to EMS patient had a witnessed seizure at home and then by the time EMS arrived they witnessed a second seizure.  Patient received 5 mg of intranasal Versed.  At the time of this evaluation patient is ANO x 3 and back to baseline.    Patient states for the last few days she has had multiple episodes of nonbloody nonbilious emesis associated with abdominal pain.  Patient states she is approximately 3 days into her period and she does have a history of heavy menstrual periods.  Also complaining of urinary frequency.    Denies fever, recent travel, chest pain, shortness of breath  Denies any other acute complaints at this time    PCP: None, None    Current Facility-Administered Medications   Medication Dose Route Frequency Provider Last Rate Last Admin    acetaminophen (TYLENOL) tablet 650 mg  650 mg Oral Q6H PRN Dulce Hogan PA-C   650 mg at 03/19/25 1838    dextrose 5 % and 0.45 % sodium chloride infusion   IntraVENous Continuous Manuel Allen  mL/hr at 03/19/25 1245 New Bag at 03/19/25 1245    sodium chloride nebulizer 0.9 % solution 3 mL  3 mL Nebulization Q4H PRN Dulce Hogan PA-C        lamoTRIgine (LAMICTAL) tablet 25 mg  25 mg Oral Daily Joy Lee MD   25 mg at 03/19/25 0802    sodium chloride flush 0.9 % injection 5-40 mL  5-40 mL IntraVENous 2 times per day Mari Turner PA-C   10 mL at 03/19/25 0803    sodium chloride flush 0.9 % injection 5-40 mL  5-40 mL IntraVENous PRN Mari Turner PA-C        0.9 % sodium chloride infusion   IntraVENous PRN Mari Turner PA-C

## 2025-03-19 ENCOUNTER — APPOINTMENT (OUTPATIENT)
Facility: HOSPITAL | Age: 30
End: 2025-03-19
Payer: COMMERCIAL

## 2025-03-19 LAB
ANION GAP SERPL CALC-SCNC: 7 MMOL/L (ref 3–18)
BACTERIA SPEC CULT: NORMAL
BASOPHILS # BLD: 0.04 K/UL (ref 0–0.1)
BASOPHILS NFR BLD: 0.6 % (ref 0–2)
BUN SERPL-MCNC: 9 MG/DL (ref 7–18)
BUN/CREAT SERPL: 9 (ref 12–20)
CALCIUM SERPL-MCNC: 8.8 MG/DL (ref 8.5–10.1)
CHLORIDE SERPL-SCNC: 107 MMOL/L (ref 100–111)
CO2 SERPL-SCNC: 25 MMOL/L (ref 21–32)
CREAT SERPL-MCNC: 0.96 MG/DL (ref 0.6–1.3)
DIFFERENTIAL METHOD BLD: ABNORMAL
EKG ATRIAL RATE: 52 BPM
EKG ATRIAL RATE: 60 BPM
EKG DIAGNOSIS: NORMAL
EKG DIAGNOSIS: NORMAL
EKG P AXIS: 45 DEGREES
EKG P AXIS: 69 DEGREES
EKG P-R INTERVAL: 150 MS
EKG P-R INTERVAL: 152 MS
EKG Q-T INTERVAL: 440 MS
EKG Q-T INTERVAL: 460 MS
EKG QRS DURATION: 80 MS
EKG QRS DURATION: 86 MS
EKG QTC CALCULATION (BAZETT): 427 MS
EKG QTC CALCULATION (BAZETT): 440 MS
EKG R AXIS: 66 DEGREES
EKG R AXIS: 80 DEGREES
EKG T AXIS: 49 DEGREES
EKG T AXIS: 67 DEGREES
EKG VENTRICULAR RATE: 52 BPM
EKG VENTRICULAR RATE: 60 BPM
EOSINOPHIL # BLD: 0.17 K/UL (ref 0–0.4)
EOSINOPHIL NFR BLD: 2.4 % (ref 0–5)
ERYTHROCYTE [DISTWIDTH] IN BLOOD BY AUTOMATED COUNT: 14.3 % (ref 11.6–14.5)
GLUCOSE SERPL-MCNC: 76 MG/DL (ref 74–99)
HCT VFR BLD AUTO: 32.6 % (ref 35–45)
HGB BLD-MCNC: 10.4 G/DL (ref 12–16)
IMM GRANULOCYTES # BLD AUTO: 0.01 K/UL (ref 0–0.04)
IMM GRANULOCYTES NFR BLD AUTO: 0.1 % (ref 0–0.5)
LYMPHOCYTES # BLD: 1.04 K/UL (ref 0.9–3.6)
LYMPHOCYTES NFR BLD: 14.6 % (ref 21–52)
MAGNESIUM SERPL-MCNC: 2 MG/DL (ref 1.6–2.6)
MCH RBC QN AUTO: 25.5 PG (ref 24–34)
MCHC RBC AUTO-ENTMCNC: 31.9 G/DL (ref 31–37)
MCV RBC AUTO: 79.9 FL (ref 78–100)
MONOCYTES # BLD: 0.98 K/UL (ref 0.05–1.2)
MONOCYTES NFR BLD: 13.7 % (ref 3–10)
NEUTS SEG # BLD: 4.9 K/UL (ref 1.8–8)
NEUTS SEG NFR BLD: 68.6 % (ref 40–73)
NRBC # BLD: 0 K/UL (ref 0–0.01)
NRBC BLD-RTO: 0 PER 100 WBC
PHOSPHATE SERPL-MCNC: 3.8 MG/DL (ref 2.5–4.9)
PLATELET # BLD AUTO: 255 K/UL (ref 135–420)
PMV BLD AUTO: 10.6 FL (ref 9.2–11.8)
POTASSIUM SERPL-SCNC: 3.6 MMOL/L (ref 3.5–5.5)
RBC # BLD AUTO: 4.08 M/UL (ref 4.2–5.3)
SERVICE CMNT-IMP: NORMAL
SODIUM SERPL-SCNC: 139 MMOL/L (ref 136–145)
WBC # BLD AUTO: 7.1 K/UL (ref 4.6–13.2)

## 2025-03-19 PROCEDURE — 2580000003 HC RX 258: Performed by: INTERNAL MEDICINE

## 2025-03-19 PROCEDURE — 97530 THERAPEUTIC ACTIVITIES: CPT

## 2025-03-19 PROCEDURE — 80048 BASIC METABOLIC PNL TOTAL CA: CPT

## 2025-03-19 PROCEDURE — 6360000002 HC RX W HCPCS

## 2025-03-19 PROCEDURE — 51798 US URINE CAPACITY MEASURE: CPT

## 2025-03-19 PROCEDURE — 97112 NEUROMUSCULAR REEDUCATION: CPT

## 2025-03-19 PROCEDURE — 36415 COLL VENOUS BLD VENIPUNCTURE: CPT

## 2025-03-19 PROCEDURE — APPSS30 APP SPLIT SHARED TIME 16-30 MINUTES

## 2025-03-19 PROCEDURE — 83735 ASSAY OF MAGNESIUM: CPT

## 2025-03-19 PROCEDURE — 93010 ELECTROCARDIOGRAM REPORT: CPT | Performed by: INTERNAL MEDICINE

## 2025-03-19 PROCEDURE — 85025 COMPLETE CBC W/AUTO DIFF WBC: CPT

## 2025-03-19 PROCEDURE — 6370000000 HC RX 637 (ALT 250 FOR IP): Performed by: PSYCHIATRY & NEUROLOGY

## 2025-03-19 PROCEDURE — 99291 CRITICAL CARE FIRST HOUR: CPT | Performed by: INTERNAL MEDICINE

## 2025-03-19 PROCEDURE — 84100 ASSAY OF PHOSPHORUS: CPT

## 2025-03-19 PROCEDURE — 94761 N-INVAS EAR/PLS OXIMETRY MLT: CPT

## 2025-03-19 PROCEDURE — 2500000003 HC RX 250 WO HCPCS

## 2025-03-19 PROCEDURE — 97162 PT EVAL MOD COMPLEX 30 MIN: CPT

## 2025-03-19 PROCEDURE — 2000000000 HC ICU R&B

## 2025-03-19 PROCEDURE — 97166 OT EVAL MOD COMPLEX 45 MIN: CPT

## 2025-03-19 PROCEDURE — 6370000000 HC RX 637 (ALT 250 FOR IP): Performed by: PHYSICIAN ASSISTANT

## 2025-03-19 RX ORDER — DEXTROSE MONOHYDRATE AND SODIUM CHLORIDE 5; .45 G/100ML; G/100ML
INJECTION, SOLUTION INTRAVENOUS CONTINUOUS
Status: DISCONTINUED | OUTPATIENT
Start: 2025-03-19 | End: 2025-03-20

## 2025-03-19 RX ORDER — ACETAMINOPHEN 325 MG/1
650 TABLET ORAL EVERY 6 HOURS PRN
Status: DISCONTINUED | OUTPATIENT
Start: 2025-03-19 | End: 2025-03-21 | Stop reason: HOSPADM

## 2025-03-19 RX ADMIN — DEXTROSE AND SODIUM CHLORIDE: 5; .45 INJECTION, SOLUTION INTRAVENOUS at 12:45

## 2025-03-19 RX ADMIN — LAMOTRIGINE 25 MG: 25 TABLET ORAL at 08:02

## 2025-03-19 RX ADMIN — ACETAMINOPHEN 650 MG: 325 TABLET ORAL at 10:27

## 2025-03-19 RX ADMIN — ONDANSETRON 4 MG: 2 INJECTION, SOLUTION INTRAMUSCULAR; INTRAVENOUS at 12:25

## 2025-03-19 RX ADMIN — ONDANSETRON 4 MG: 2 INJECTION, SOLUTION INTRAMUSCULAR; INTRAVENOUS at 17:31

## 2025-03-19 RX ADMIN — ACETAMINOPHEN 650 MG: 325 TABLET ORAL at 18:38

## 2025-03-19 RX ADMIN — ENOXAPARIN SODIUM 40 MG: 100 INJECTION SUBCUTANEOUS at 08:02

## 2025-03-19 RX ADMIN — SODIUM CHLORIDE, PRESERVATIVE FREE 10 ML: 5 INJECTION INTRAVENOUS at 08:03

## 2025-03-19 ASSESSMENT — PAIN SCALES - GENERAL
PAINLEVEL_OUTOF10: 8
PAINLEVEL_OUTOF10: 0
PAINLEVEL_OUTOF10: 8

## 2025-03-19 ASSESSMENT — PAIN SCALES - WONG BAKER: WONGBAKER_NUMERICALRESPONSE: NO HURT

## 2025-03-19 ASSESSMENT — PAIN DESCRIPTION - LOCATION: LOCATION: ABDOMEN

## 2025-03-19 NOTE — PLAN OF CARE
Problem: Occupational Therapy - Adult  Goal: By Discharge: Performs self-care activities at highest level of function for planned discharge setting.  See evaluation for individualized goals.  Description: Occupational Therapy Goals:  Initiated 3/19/2025 to be met within 7-10 days.    1.  Patient will perform grooming with supervision/set-up while standing at the sink for > 2 min with Good balance.   2.  Patient will perform bathing with supervision/set-up.  3.  Patient will perform lower body dressing with supervision/set-up.  4.  Patient will perform toilet transfers with supervision/set-up.  5.  Patient will perform all aspects of toileting with supervision/set-up.  6.  Patient will participate in upper extremity therapeutic exercise/activities with supervision/set-up for 8 minutes to improve endurance and UB strength needed for ADLs    7.  Patient will utilize energy conservation techniques during functional activities with verbal cues.    PLOF: Pt lives with family on 3rd floor of 3SH, independent with ADLs and functional mobility. Plays professional basketball.    Outcome: Progressing  OCCUPATIONAL THERAPY EVALUATION    Patient: Katherine Hendrix (29 y.o. female)  Date: 3/19/2025  Primary Diagnosis: Status epilepticus (HCC) [G40.901]       Precautions: Fall Risk    ASSESSMENT :  Pt agreeable to OT, twin sister present in room. Pt c/o generalized weakness and headache with functional mobility. BUE strength is symmetrical., grossly 4/5 with poor effort. Pt c/o dizziness in sitting /72. Pt initially demod Fair balance which improved during the session. Min A to std with FWW in prep for ADLs, however, pt c/o nausea, weakness, and dizziness, requested to return to bed, positioned for comfort. /80. Mult family members present at the end of the session.    Recommend for next OT session:  Bedside commode transfer    DEFICITS/IMPAIRMENTS:  Performance deficits / Impairments: Decreased functional mobility

## 2025-03-19 NOTE — PLAN OF CARE
Problem: Physical Therapy - Adult  Goal: By Discharge: Performs mobility at highest level of function for planned discharge setting.  See evaluation for individualized goals.  Description: Physical Therapy Goals  Initiated 3/19/2025 and to be accomplished within 7 day(s)  1.  Patient will move from supine to sit and sit to supine in bed with modified independence in preparation for seated tasks.    2.  Patient will transfer from bed to chair and chair to bed with modified independence using the least restrictive device in preparation for out of bed.  3.  Patient will perform sit to stand with modified independence in preparation for out of bed tasks.  4.  Patient will ambulate with modified independence for 50 feet with the least restrictive device in preparation for home setting.   5.  Patient will ascend/descend 8 stairs with handrail(s) with supervision/set-up in preparation for negotiation of home set-up.    PLOF: Lives with family in 3 story home with bedroom/bathroom on 3rd floor. Independent.   Outcome: Progressing   PHYSICAL THERAPY EVALUATION    Patient: Katherine Hendrix (29 y.o. female)  Date: 3/19/2025  Primary Diagnosis: Status epilepticus (HCC) [G40.901]  Precautions: Fall Risk  ASSESSMENT :  Evaluated in ICU. Twin sister at bedside; provide home set-up. Reports numbness in BLE from hips to feet; reports able to sense light touch. Transferred to EOB without physical assistance. Seated EOB with poor posture; functional kyphosis and cervical flexion. Keeps eyes closed with hand over eyes for majority of time seated d/t reported dizziness. BP assessed; see below. Encouraged AROM BLE and focusing on stationary object to alleviate dizziness. Reports no food for 3 days; ate half a banana from breakfast tray before feeling nauseous. Poor effort with BLE AROM exercises. Min A for sit to stand. Requires two attempts and cues for safe hand placement. Once standing report increased dizziness and returned to seated.  Caregiver present  [x]         Bed alarm activated  []         Chair alarm activated  []         SCDs applied    COMMUNICATION/EDUCATION:   Patient Education  Education Given To: Patient;Family  Education Provided: Role of Therapy;Plan of Care  Education Method: Demonstration;Verbal;Teach Back  Barriers to Learning: None  Education Outcome: Verbalized understanding;Demonstrated understanding;Continued education needed    Thank you for this referral.  Lina Bullard, PT  Minutes: 20    Eval Complexity: Decision Making: Medium Complexity

## 2025-03-19 NOTE — PROGRESS NOTES
Physician Progress Note      PATIENT:               FOX CONTRERAS  CSN #:                  [id number]  :                       1995  ADMIT DATE:       3/17/2025 8:03 PM  DISCH DATE:  RESPONDING  PROVIDER #:        Pamela Ruelas MD          QUERY TEXT:    Patient admitted with seizure. Noted documentation of intubation for airway   protection in 3/18 pn and acute respiratory failure in H&P. Please indicate   one of the following and document in the medical record:  The medical record reflects the following:  Risk Factors: seizure like activity  Clinical Indicators: H&P-intubated in the ED for airway protection after   multiple rounds of versed.  Treatment:  s/p intubation  Options provided:  -- Intubated for Acute Respiratory Failure as evidenced by, Please document   evidence.  -- Intubated for airway protection only, Acute Respiratory Failure ruled out   after study  -- Other - I will add my own diagnosis  -- Disagree - Not applicable / Not valid  -- Disagree - Clinically unable to determine / Unknown  -- Refer to Clinical Documentation Reviewer    PROVIDER RESPONSE TEXT:    Provider disagreed with this query.    Query created by: Kennedi Arvizu on 3/19/2025 12:04 PM      Electronically signed by:  Pamela Ruelas MD 3/19/2025 12:10 PM

## 2025-03-19 NOTE — H&P
[facility].  Pulmonary, Critical Care, and Sleep Medicine    Name: Katherine Hendrix MRN: [id number]   : 1995 Hospital: [facility]   Date: 3/19/2025  Admission Date: 3/17/2025     Chart and notes reviewed. Data reviewed. I have evaluated all findings.    [x]I have reviewed the flowsheet and previous day’s notes.    []The patient is unable to give any meaningful history or review of systems because the patient is:  []Intubated []Sedated   []Unresponsive      []The patient is critically ill on      []Mechanical ventilation []Pressors   []BiPAP []     IMPRESSION:   Psychogenic nonepileptiform seizures - Neuro exam unrevealing . Pt did appear to be shaking her legs and head in a restless motion, but was completely alert and oriented, following commands, never postictal, and no grand mal seizure effect.         - CT/CTA Head negative   Hx of Anxiety  Hx of Suicide attempt - 2019  Hx of OD on Zoloft   Hx of Asthma     Patient Active Problem List   Diagnosis    Status epilepticus (HCC)    Acute respiratory failure with hypoxia (HCC)    Psychosomatic seizure        RECOMMENDATIONS:   Neuro:   - CT/CTA head negative  - S/p Keppra & Versed   - Consulted Neurology - added Lamotrigine      Pulm: s/p intubation. Extubated 25     CVS: Sinus laurel per her norm.      GI: SUP, Trend LFTs, Zofran PRN for N/V, Regular Diet     Renal:  Trend Renal indices, Strict Is/Os, Ordered Cook removal. CK negative      Hem/Onc: Monitor for s/o active bleeding. On Lovenox      I/D: Blood, Sputum, and Urine cultures all negative.   Lactic acid negative. Procal negative.  Antibiotics: None. Trend WBCs and temperature curve.     Endocrine: Q6 glucoses, SSI. TSH level pending     Metabolic:  Daily BMP, mag, phos. Trend lytes, replace as needed.      Musc/Skin: no acute issues, wound care. PT/OT ordered      Toxicity:  UDS, Fentanyl, Oxy, EToH, Volatile, ASA, Acetaminophen - all negative     GYN: HCG  negative 03/17/25     Palliative: Full Code No additional code details, MPOA: Mother Gita, updated at bedside    Discussed in interdisciplinary rounds     Best practice :    Glycemic control  IHI ICU bundles:     Mech Vent patients-      Stress ulcer prophylaxis.   Pepcid  DVT prophylaxis.   Lovenox  Need for Lines, rider assessed.  Palliative care evaluation.  Restraints need. Not  needed   Attending Non-violent Restraint Reevaluation       Interval HPI:  Patient is a 29 y.o. female who was admitted to the ICU for possible status epilepticus vs pseudoseizures.  Pt remained alert with no signs of being postictal.  Pt was intubated in the ED for airway protection after multiple rounds of versed. Pt was also started on Keppra. Ceribell placed once in the ICU, showed a seizure burden of 0%. Pt remained alert and able to follow commands during episodes of shaking. She then had an episode of her facing shaking and eyelids fluttering. She was not responding but it abated with nailbed pressure. Labs were insignificant. LA negative, Ck negative, Procal negative, UDS negative, pregnancy test negative. Neurology consulted, diagnosed with Psychogenic nonepileptiform seizures. Keppra was discontinued and placed on Lamictal 25 mgs daily and recommended no driving for 6 months. To evaluate outpatient in 2 weeks.          Subjective 03/19/25  Hospital Day: 2  Vent Day: extubated 03/18/25  Overnight events: n/a  Mentation/Activity: A&Ox3  Respiratory/ Secretions: None  Hemodynamics: stable  Urine output, bowel: good u/p.   Diet: regular diet.   Need for procedures: n/a               ROS:Pertinent items are noted in HPI.    Events, medications, imaging, and notes from last 24 hours reviewed.     Patient Active Problem List   Diagnosis    Status epilepticus (HCC)    Acute respiratory failure with hypoxia (HCC)    Psychosomatic seizure       Vital Signs:  /65   Pulse 50   Temp 97.9 °F (36.6 °C) (Oral)   Resp 25   Ht 1.981

## 2025-03-19 NOTE — CONSULTS
[facility]  Consultation Note    Date of Service:  03/19/25    Historian(s): The patient herself, ICU with staff, medical records reviewed.  Referral Source: ICU staff.    Chief Complaint   Patient with a history of depression and anxiety, admitting after showing quality was described as seizure activity.  Requiring to be admitted to the ICU, the patient has been currently diagnosed as having a pseudoseizures and with her prior history of requiring psychiatric care, a psychiatric consultation was requested.    History of Present Illness     Katherine Hendrix is a 29 y.o. Black /  female  with a history of depressive disorder, and anxiety, who is referred for a psychiatric consultation after being diagnosed with pseudoseizures.  Information obtained from the patient herself the same as by reviewing the patient's chart, it is not clear as to for how long, she has experienced them.  It is not clear either as to for how long she has been depressed off-and-on, requiring outpatient psychiatric care.  It is my understanding however, that she has never required to be hospitalized psychiatrically however the patient does have a history is of a suicidal attempt on or about 2019.  It is my understanding that the patient did receive treatment with sertraline, however was not able to obtain from her, which dose was prescribed.   One of the reasons it was difficult to obtain any information from the patient today is because she was drowsy, responding to medications prescribed for her.  She did mention that she is being followed by PFM, however is no clear as to when where she is seeking thoughts.    Psychiatric Treatment History     Self-injurious behavior/risky thoughts or behaviors (past suicidal ideation/attempt): 1 prior suicidal attempt, by overdosing apparently on sertraline, on about 2019.  That information was obtained from the patient's chart, and it was not disclosed by her at the  pseudoseizure disorder, she can be hurt even though nonintentionally.  6.  Again the consultation is appreciated, please let me know where the patient is going to be transferred to, so we can follow her over there.    Thank you for the consultation.        WESLEY HUYNH MD. [facility]  Psychiatrist  [facility]  60 minutes session including 30 minutes with the patient face-to-face, 15 minutes discussion with ICU staff and reviewing the patient's chart, 15 minutes reviewing the patient's medical record and dictating this note

## 2025-03-19 NOTE — CARE COORDINATION
Chart reviewed met with patient and mother at bedside. HIPAA/demographics verified. Cm provided patients mother with Home health list for viewing if needed for safe discharge planning. No needs from cm at this time, cm will cont to follow case and remain available.   03/19/25 7805   Service Assessment   Patient Orientation Alert and Oriented   Cognition Alert   History Provided By Patient;Other (see comment)  (mother- Gita Wilcox)   Primary Caregiver Self   Accompanied By/Relationship [facility]-mother   Support Systems Parent;Family Members   Patient's Healthcare Decision Maker is: Patient Declined (Legal Next of Kin Remains as Decision Maker)   PCP Verified by CM Yes  (doesn't have name at this time)   Last Visit to PCP Within last year   Prior Functional Level Independent in ADLs/IADLs   Current Functional Level Independent in ADLs/IADLs   Can patient return to prior living arrangement Yes   Ability to make needs known: Good   Family able to assist with home care needs: Yes   Would you like for me to discuss the discharge plan with any other family members/significant others, and if so, who? Yes  ([facility]- mother)   Financial Resources Other (Comment)  ([facility])   Community Resources None   CM/SW Referral   (discharge planning)   Social/Functional History   Lives With Parent;Family   Type of Home House   Home Layout Multi-level   Home Access Stairs to enter with rails   Entrance Stairs - Number of Steps 8   Entrance Stairs - Rails Both   Bathroom Shower/Tub Tub/Shower unit   Bathroom Toilet Standard   Bathroom Equipment None   Bathroom Accessibility Accessible   Home Equipment None   Receives Help From Family   Prior Level of Assist for ADLs Independent   Prior Level of Assist for Homemaking Independent   Ambulation Assistance Independent   Prior Level of Assist for Transfers Independent   Active  Yes   Mode of Transportation Car   Occupation Unemployed   Discharge Planning   Type

## 2025-03-19 NOTE — PROGRESS NOTES
[facility]  Pulmonary, Critical Care, and Sleep Medicine    Name: Katherine Hendrix MRN: [id number]   : 1995 Hospital: [facility]   Date: 3/19/2025        Critical Care History and Physical      IMPRESSION:   Status Epilepticus vs Pseudoseizures? - Neuro exam unrevealing . Pt did appear to be shaking her legs and head in a restless motion, but was completely alert and oriented, following commands, never postictal, and no grand mal seizure effect.      - CT/CTA Head negative   Intubated for airway protection   Hx of Anxiety  Hx of Suicide attempt - 2019  Hx of OD on Zoloft   Hx of Asthma      Patient Active Problem List   Diagnosis    Status epilepticus (HCC)    Acute respiratory failure with hypoxia (HCC)    Psychosomatic seizure        RECOMMENDATIONS:   Neuro: Titrate sedation to RASS of 0 to -1. PRN for breakthrough sedation needs.   - CT/CTA head negative  - Keppra. Versed PRN for seizures   - Sedation: Propofol & Precedex. PRN Fentanyl. S/p Versed gtt in the ED   - q4h neuro checks  - Consult Neurology    Pulm: Titrate FiO2 for goal SPO2> 90%,VAP prevention bundle, head of the bed at 30' all times.   Daily sedation holiday and assessment for weaning with SBT as tolerated.    PRN duonebs.     CVS: Keep MAP >65mmHg.     GI: SUP, Trend LFTs, Zofran PRN for N/V, Diet/NPO    Renal:  Trend Renal indices, Strict Is/Os, Cook (+)  CK negative     Hem/Onc: Monitor for s/o active bleeding. On Lovenox     I/D: Sepsis bundle per hospital protocol, Blood, Sputum, and Urine cultures drawn and will be followed.   Lactic acid ordered- initial and repeat Q4hrs till normalized.   Antibiotics: None. Trend WBCs and temperature curve.    Endocrine: Q6 glucoses, SSI. TSH level pending    Metabolic:  Daily BMP, mag, phos. Trend lytes, replace as needed.     Musc/Skin: no acute issues, wound care    Toxicity:  UDS, Fentanyl, Oxy, EToH, Volatile, ASA, Acetaminophen - all negative    GYN:  left common femoral vein,    Signed by: Cooper Holbrook on 7/31/2023  4:40 PM     Ultrasound Result (most recent):  No results found for this or any previous visit from the past 3650 days.      No results found for this or any previous visit.              Total of 15 min critical care time spent at bedside during the course of care providing evaluation,management and care decisions and ordering appropriate treatment related to critical care problems exclusive of procedures.  The reason for providing this level of medical care for this critically ill patient was due a critical illness that impaired one or more vital organ systems such that there was a high probability of imminent or life threatening deterioration in the patients condition. This care involved high complexity decision making to assess, manipulate, and support vital system functions, to treat this degree vital organ system failure and to prevent further life threatening deterioration of the patient’s condition.        Dulce Hogan PA-C  03/19/25  Pulmonary, Critical Care Medicine  [facility]

## 2025-03-19 NOTE — PROGRESS NOTES
attended the interdisciplinary rounds for Katherine Hendrix, who is a 29 y.o.,female. Patient's Primary Language is: English.   According to the patient's EMR Sikhism Affiliation is: Yazidism.     The reason the Patient came to the hospital is:   Patient Active Problem List    Diagnosis Date Noted    Acute respiratory failure with hypoxia (HCC) 03/18/2025    Psychosomatic seizure 03/18/2025    Status epilepticus (HCC) 03/17/2025          Plan:   participated and listen to the recommendations of the IDR team. Patient alert and following commands. May be transferred on today.   Chaplains will continue to follow and will provide pastoral care on an as needed/requested basis.   recommends bedside caregivers page  on duty if patient shows signs of acute spiritual or emotional distress.     [place]  Staff   Spiritual Health   [phone]

## 2025-03-20 LAB
25(OH)D3 SERPL-MCNC: 13.6 NG/ML (ref 30–100)
ANION GAP SERPL CALC-SCNC: 6 MMOL/L (ref 3–18)
BACTERIA SPEC CULT: NORMAL
BASOPHILS # BLD: 0.05 K/UL (ref 0–0.1)
BASOPHILS NFR BLD: 1 % (ref 0–2)
BUN SERPL-MCNC: 10 MG/DL (ref 7–18)
BUN/CREAT SERPL: 9 (ref 12–20)
CALCIUM SERPL-MCNC: 8.6 MG/DL (ref 8.5–10.1)
CHLORIDE SERPL-SCNC: 106 MMOL/L (ref 100–111)
CO2 SERPL-SCNC: 25 MMOL/L (ref 21–32)
CREAT SERPL-MCNC: 1.13 MG/DL (ref 0.6–1.3)
DIFFERENTIAL METHOD BLD: ABNORMAL
EOSINOPHIL # BLD: 0.22 K/UL (ref 0–0.4)
EOSINOPHIL NFR BLD: 4.5 % (ref 0–5)
ERYTHROCYTE [DISTWIDTH] IN BLOOD BY AUTOMATED COUNT: 14.5 % (ref 11.6–14.5)
FOLATE SERPL-MCNC: 10.9 NG/ML (ref 3.1–17.5)
GLUCOSE SERPL-MCNC: 109 MG/DL (ref 74–99)
GRAM STN SPEC: NORMAL
HCT VFR BLD AUTO: 32.6 % (ref 35–45)
HGB BLD-MCNC: 10.3 G/DL (ref 12–16)
IMM GRANULOCYTES # BLD AUTO: 0.01 K/UL (ref 0–0.04)
IMM GRANULOCYTES NFR BLD AUTO: 0.2 % (ref 0–0.5)
LYMPHOCYTES # BLD: 1.62 K/UL (ref 0.9–3.6)
LYMPHOCYTES NFR BLD: 33.3 % (ref 21–52)
MAGNESIUM SERPL-MCNC: 1.7 MG/DL (ref 1.6–2.6)
MCH RBC QN AUTO: 25.4 PG (ref 24–34)
MCHC RBC AUTO-ENTMCNC: 31.6 G/DL (ref 31–37)
MCV RBC AUTO: 80.5 FL (ref 78–100)
MONOCYTES # BLD: 0.61 K/UL (ref 0.05–1.2)
MONOCYTES NFR BLD: 12.5 % (ref 3–10)
NEUTS SEG # BLD: 2.36 K/UL (ref 1.8–8)
NEUTS SEG NFR BLD: 48.5 % (ref 40–73)
NRBC # BLD: 0 K/UL (ref 0–0.01)
NRBC BLD-RTO: 0 PER 100 WBC
PHOSPHATE SERPL-MCNC: 4.2 MG/DL (ref 2.5–4.9)
PLATELET # BLD AUTO: 253 K/UL (ref 135–420)
PMV BLD AUTO: 10.6 FL (ref 9.2–11.8)
POTASSIUM SERPL-SCNC: 3.7 MMOL/L (ref 3.5–5.5)
RBC # BLD AUTO: 4.05 M/UL (ref 4.2–5.3)
SERVICE CMNT-IMP: NORMAL
SODIUM SERPL-SCNC: 137 MMOL/L (ref 136–145)
VIT B12 SERPL-MCNC: 709 PG/ML (ref 211–911)
WBC # BLD AUTO: 4.9 K/UL (ref 4.6–13.2)

## 2025-03-20 PROCEDURE — 6370000000 HC RX 637 (ALT 250 FOR IP): Performed by: FAMILY MEDICINE

## 2025-03-20 PROCEDURE — 99233 SBSQ HOSP IP/OBS HIGH 50: CPT | Performed by: FAMILY MEDICINE

## 2025-03-20 PROCEDURE — 97530 THERAPEUTIC ACTIVITIES: CPT

## 2025-03-20 PROCEDURE — 82306 VITAMIN D 25 HYDROXY: CPT

## 2025-03-20 PROCEDURE — 36415 COLL VENOUS BLD VENIPUNCTURE: CPT

## 2025-03-20 PROCEDURE — 83735 ASSAY OF MAGNESIUM: CPT

## 2025-03-20 PROCEDURE — 82607 VITAMIN B-12: CPT

## 2025-03-20 PROCEDURE — 85025 COMPLETE CBC W/AUTO DIFF WBC: CPT

## 2025-03-20 PROCEDURE — 82746 ASSAY OF FOLIC ACID SERUM: CPT

## 2025-03-20 PROCEDURE — 2000000000 HC ICU R&B

## 2025-03-20 PROCEDURE — 80048 BASIC METABOLIC PNL TOTAL CA: CPT

## 2025-03-20 PROCEDURE — 6360000002 HC RX W HCPCS

## 2025-03-20 PROCEDURE — 84100 ASSAY OF PHOSPHORUS: CPT

## 2025-03-20 RX ORDER — VITAMIN B COMPLEX
1000 TABLET ORAL DAILY
Status: DISCONTINUED | OUTPATIENT
Start: 2025-03-20 | End: 2025-03-21 | Stop reason: HOSPADM

## 2025-03-20 RX ORDER — MIRTAZAPINE 15 MG/1
15 TABLET, FILM COATED ORAL NIGHTLY
Status: DISCONTINUED | OUTPATIENT
Start: 2025-03-20 | End: 2025-03-20

## 2025-03-20 RX ADMIN — ONDANSETRON 4 MG: 2 INJECTION, SOLUTION INTRAMUSCULAR; INTRAVENOUS at 09:16

## 2025-03-20 RX ADMIN — ENOXAPARIN SODIUM 40 MG: 100 INJECTION SUBCUTANEOUS at 09:16

## 2025-03-20 RX ADMIN — Medication 1000 UNITS: at 17:00

## 2025-03-20 RX ADMIN — SERTRALINE HYDROCHLORIDE 25 MG: 50 TABLET ORAL at 17:30

## 2025-03-20 ASSESSMENT — PAIN SCALES - GENERAL: PAINLEVEL_OUTOF10: 0

## 2025-03-20 ASSESSMENT — PAIN SCALES - WONG BAKER: WONGBAKER_NUMERICALRESPONSE: NO HURT

## 2025-03-20 NOTE — PROGRESS NOTES
Notified by ICU team earlier today of plan for downgrade.  Chart has been reviewed, hospitalist team to follow-up in AM.

## 2025-03-20 NOTE — PLAN OF CARE
Problem: Occupational Therapy - Adult  Goal: By Discharge: Performs self-care activities at highest level of function for planned discharge setting.  See evaluation for individualized goals.  Description: Occupational Therapy Goals:  Initiated 3/19/2025 to be met within 7-10 days.    1.  Patient will perform grooming with supervision/set-up while standing at the sink for > 2 min with Good balance.   2.  Patient will perform bathing with supervision/set-up.  3.  Patient will perform lower body dressing with supervision/set-up.  4.  Patient will perform toilet transfers with supervision/set-up.  5.  Patient will perform all aspects of toileting with supervision/set-up.  6.  Patient will participate in upper extremity therapeutic exercise/activities with supervision/set-up for 8 minutes to improve endurance and UB strength needed for ADLs    7.  Patient will utilize energy conservation techniques during functional activities with verbal cues.    PLOF: Pt lives with family on 3rd floor of [address], independent with ADLs and functional mobility. Plays professional basketball.    Outcome: Progressing   OCCUPATIONAL THERAPY TREATMENT    Patient: Katherine Hendrix (29 y.o. female)  Date: 3/20/2025  Diagnosis: Status epilepticus (HCC) [G40.901] Status epilepticus (HCC)      Precautions: Fall Risk,  ,  ,  ,  ,  ,  ,      Chart, occupational therapy assessment, plan of care, and goals were reviewed.  ASSESSMENT:  Pt presents in bed, at first attempt pt declines, however at second attempt pt agrees to participate in OT tx. Pt c/o lightheadedness and headache throughout session regardless of position of pt. Pt is able to tolerate EOB sitting to don socks, however declines standing at this time. Scooting on EOB with SBA and min increased time required. Pt is left in bed with all needs met and call bell within reach, nursing notified of pt's status.      Progression toward goals:  []          Improving appropriately and progressing  toward goals  [x]          Improving slowly and progressing toward goals  []          Not making progress toward goals and plan of care will be adjusted     PLAN:  Patient continues to benefit from skilled intervention to address the above impairments.  Continue treatment per established plan of care.    Further Equipment Recommendations for Discharge: shower chair and rolling walker    AMPAC: AM-PAC Inpatient Daily Activity Raw Score: 19    Current research shows that an AM-PAC score of 18 or greater is associated with a discharge to the patient's home setting.    This AMPAC score should be considered in conjunction with interdisciplinary team recommendations to determine the most appropriate discharge setting. Patient's social support, diagnosis, medical stability, and prior level of function should also be taken into consideration.     SUBJECTIVE:   Patient stated, \"I did all that earlier in the bathroom.\" (In regards to grooming tasks/ toileting)    OBJECTIVE DATA SUMMARY:   Cognitive/Behavioral Status:  Orientation  Overall Orientation Status: Within Functional Limits  Orientation Level: Oriented X4  Cognition  Overall Cognitive Status: WFL    Functional Mobility and Transfers for ADLs:   Bed Mobility:  Bed Mobility Training  Rolling: Modified independent  Supine to Sit: Stand by assistance  Sit to Supine: Stand by assistance  Scooting: Stand by assistance     Balance:  Balance  Sitting: Intact  Sitting - Static: Good (unsupported)  Sitting - Dynamic: Good (unsupported)    ADL Intervention:  LE Dressing: Modified independent  (to put socks on seated on EOB with min increased time required)    Pain:  Intensity Pre-treatment: 8/10   Intensity Post-treatment: 8/10  Scale: Numeric Rating Scale  Location: Head  Quality: Throbbing  Intervention(s): Nurse notified and Rest    Activity Tolerance:     Pt is limited by HA and lightheadedness / dizziness  Please refer to the flowsheet for vital signs taken during this  treatment.  After treatment:   []  Patient left in no apparent distress sitting up in chair  [x]  Patient left in no apparent distress in bed  [x]  Call bell left within reach  [x]  Nursing notified  []  Caregiver present  [x]  Bed/chair alarm activated      COMMUNICATION/EDUCATION:   Patient Education  Education Given To: Patient  Education Provided: Role of Therapy;Transfer Training;Plan of Care;Energy Conservation  Education Method: Verbal;Teach Back  Barriers to Learning: None  Education Outcome: Verbalized understanding      Thank you for this referral.  DANYELL Contreras  Minutes: 16

## 2025-03-20 NOTE — PROGRESS NOTES
Comprehensive Nutrition Assessment    Type and Reason for Visit:  Reassess    Nutrition Recommendations/Plan:   Modify PO diet to Vegetarian per mother request.  Order Angie Zhaogang ONS once daily.  Encourage PO intake. Please document PO intake in flowsheets, even if 0%.   D5 per MD.  Daily wts.  Continue to monitor tolerance of PO, compliance of oral supplements, weight, labs, and plan of care during admission.     Malnutrition Assessment:  Malnutrition Status:  At risk for malnutrition (s/p extubation) (03/18/25 1144)    Context:  Acute Illness       Nutrition Assessment:    Admitted for seizures. Per H&P: pt c/o multiple episodes of emesis, abd pain for a few days PTA. Intubated for airway protection 3/17; s/p extubation and CLD initiated 3/18; diet advanced to regular 3/19. Per chart review, pt threatened to leave AMA, unable to feed self and claims she cannot eat. Pt transferred out of ICU status. No PO documented in flowsheets. Will add oral supplements to increase calorie/protein intake opportunity. Addendum: noted diet ordered as vegan. Spoke to mother via phone - reports pt is vegan/vegetarian, does not want any meat, and wants Vegetarian ordered in diet. Mother brings in food for pt. Agreeable for Angie Zhaogang ONS in-house. Will continue to monitor PO intake.    Meal Intake: No data found.    Nutrition Related Findings:    Pertinent Meds:   Lovenox  D5 1/2 NS @ 100 ml/hr (120g dex, 408 kcal)    Pertinent Labs:  Recent Labs     03/19/25  0218 03/20/25  0030   GLUCOSE 76 109*   BUN 9 10   CREATININE 0.96 1.13    137   K 3.6 3.7    106   CO2 25 25   CALCIUM 8.8 8.6   PHOS 3.8 4.2   MG 2.0 1.7     Recent Labs     03/18/25  0549   POCGLU 93       Last BM:  (PTA)    Skin: Wound Type: None    Edema:    None      Current Nutrition Intake & Therapies:    Average Meal Intake: Unable to assess  Average Supplements Intake: None Ordered  ADULT ORAL NUTRITION SUPPLEMENT; Lunch; Clear Liquid Oral  Supplement  ADULT ORAL NUTRITION SUPPLEMENT; Breakfast, Dinner; Standard High Calorie/High Protein Oral Supplement  ADULT DIET; Regular    Anthropometric Measures:  Height: 198.1 cm (6' 6\")  Ideal Body Weight (IBW): 190 lbs (86 kg)    Admission Body Weight: 98.9 kg (218 lb 0.6 oz)  Current Body Weight: 97.3 kg (214 lb 8.1 oz) (taken by RD), 112.9 % IBW.    Current BMI (kg/m2): 24.8  Usual Body Weight: 97.5 kg (215 lb) (per pt)  % Weight Change (Calculated): -0.2  Weight Adjustment For: No Adjustment  BMI Categories: Normal Weight (BMI 18.5-24.9)    Estimated Daily Nutrient Needs:  Energy Requirements Based On: Kcal/kg  Weight Used for Energy Requirements: Current  Energy (kcal/day): 8495-1354 (25-30)  Weight Used for Protein Requirements: Current  Protein (g/day):  (1-1.2)  Method Used for Fluid Requirements: 1 ml/kcal  Fluid (ml/day): 9834-2423    Nutrition Diagnosis:   Inadequate oral intake related to acute injury/trauma as evidenced by nausea (pt claims she cannot eat)    Nutrition Interventions:   Food and/or Nutrient Delivery: Continue Current Diet, Start Oral Nutrition Supplement  Nutrition Education/Counseling: No recommendation at this time  Coordination of Nutrition Care: Continue to monitor while inpatient  Plan of Care discussed with: .    Goals:  Previous Goal Met: Progressing toward Goal(s) (slowly)  Goals: PO intake 75% or greater, by next RD assessment       Nutrition Monitoring and Evaluation:   Behavioral-Environmental Outcomes: None Identified  Food/Nutrient Intake Outcomes: Food and Nutrient Intake, Supplement Intake, IVF Intake  Physical Signs/Symptoms Outcomes: Biochemical Data, Constipation, GI Status, Nausea or Vomiting, Fluid Status or Edema, Hemodynamic Status, Meal Time Behavior, Nutrition Focused Physical Findings, Weight    Discharge Planning:    Continue current diet     Megan Dockweiler, MS, RD, CNSC  Contact: [phone]   Available via [employer]

## 2025-03-20 NOTE — PROGRESS NOTES
ARU/IPR REFERRAL CONTACT NOTE  [facility]      Thank you for the opportunity to review this patient's case for admission to [facility].    Referral received: 3/20/2025 time:11:00am    Based on our pre-admission screening:                          [ x] This patient does not meet criteria for admission to [facility] due to:    Pt does not have qualifying rehab diagnosis  Most current OT note does not reflect need for ARU density level of therapy    Again, Thank you for this referral. Should you have any questions please do not hesitate to call.     Sincerely,  JESUS Handy, CTRS      Clinical Liaison  [facility]  [phone]

## 2025-03-20 NOTE — CARE COORDINATION
Noted DME order for rolling walker and shower chair, orders placed in [place], cm delivered rolling walker to room at patients bedside, shower chair will be delivered to patients home via [employer].

## 2025-03-20 NOTE — PROGRESS NOTES
[facility] Hospitalist Group  Progress Note    Patient: Katherine Hendrix Age: 29 y.o. : 1995 MR#: [id number] SSN: [id number]      Subjective/24-hour events:     Feeling better today, actually inquiring about when she will be able to go home.  Has been back today stating that it makes her feel sleepy and sluggish.  Worried about \"lump\" on right breast that was never evaluated as outpatient.    Assessment:   MDD  Generalized anxiety disorder  Pseudoseizure    Plan:   Breast exam performed in presence of nursing staff.  Normal breast tissue palpated, reassurance provided that this is not something that needs further evaluation at this point.  Will discontinue antiepileptic therapy as patient is refusing.  Given likely diagnosis of pseudoseizure, this should not be necessary going forward,, especially if patient is reporting untoward side effects.  Discussion had with patient and her mother at bedside regarding reported Zoloft overdose.  This issue happened approximately 5 years ago and patient was placed back on medication and recently discontinued 2 months ago per their report.  Will resume at low-dose as this may be inciting factor and patient's worsening symptomatology over the past couple of months.  She is requesting new PCP and will assist in obtaining prior to discharge.  Home tomorrow stable.  Discussed with patient and mother at bedside as well as nursing staff.  All current questions answered.    Anticipated discharge: 3/21    Case discussed with:  [x]Patient  [x]Family  [x] Nursing  [x]Case Management  DVT Prophylaxis:  [x]Lovenox  []Hep SQ  []SCDs  []Coumadin   []On Heparin gtt []PO anticoagulant    Objective:   VS: /78   Pulse 54   Temp 97.9 °F (36.6 °C) (Oral)   Resp 15   Ht 1.981 m (6' 6\")   Wt 97.3 kg (214 lb 8.1 oz)   SpO2 100%   BMI 24.79 kg/m²      Tmax/24hrs: Temp (24hrs), Av.9 °F (36.6 °C), Min:97.9 °F (36.6 °C), Max:97.9 °F (36.6 °C)    Intake/Output  Ref Range    WBC 4.9 4.6 - 13.2 K/uL    RBC 4.05 (L) 4.20 - 5.30 M/uL    Hemoglobin 10.3 (L) 12.0 - 16.0 g/dL    Hematocrit 32.6 (L) 35.0 - 45.0 %    MCV 80.5 78.0 - 100.0 FL    MCH 25.4 24.0 - 34.0 PG    MCHC 31.6 31.0 - 37.0 g/dL    RDW 14.5 11.6 - 14.5 %    Platelets 253 135 - 420 K/uL    MPV 10.6 9.2 - 11.8 FL    Nucleated RBCs 0.0 0  WBC    nRBC 0.00 0.00 - 0.01 K/uL    Neutrophils % 48.5 40.0 - 73.0 %    Lymphocytes % 33.3 21.0 - 52.0 %    Monocytes % 12.5 (H) 3.0 - 10.0 %    Eosinophils % 4.5 0.0 - 5.0 %    Basophils % 1.0 0.0 - 2.0 %    Immature Granulocytes % 0.2 0.0 - 0.5 %    Neutrophils Absolute 2.36 1.80 - 8.00 K/UL    Lymphocytes Absolute 1.62 0.90 - 3.60 K/UL    Monocytes Absolute 0.61 0.05 - 1.20 K/UL    Eosinophils Absolute 0.22 0.00 - 0.40 K/UL    Basophils Absolute 0.05 0.00 - 0.10 K/UL    Immature Granulocytes Absolute 0.01 0.00 - 0.04 K/UL    Differential Type AUTOMATED     Basic Metabolic Panel    Collection Time: 03/20/25 12:30 AM   Result Value Ref Range    Sodium 137 136 - 145 mmol/L    Potassium 3.7 3.5 - 5.5 mmol/L    Chloride 106 100 - 111 mmol/L    CO2 25 21 - 32 mmol/L    Anion Gap 6 3.0 - 18 mmol/L    Glucose 109 (H) 74 - 99 mg/dL    BUN 10 7.0 - 18 MG/DL    Creatinine 1.13 0.6 - 1.3 MG/DL    BUN/Creatinine Ratio 9 (L) 12 - 20      Est, Glom Filt Rate 68 >60 ml/min/1.73m2    Calcium 8.6 8.5 - 10.1 MG/DL   Vitamin B12 & Folate    Collection Time: 03/20/25 10:42 AM   Result Value Ref Range    Vitamin B-12 709 211 - 911 pg/mL    Folate 10.9 3.10 - 17.50 ng/mL   Vitamin D 25 Hydroxy    Collection Time: 03/20/25 10:42 AM   Result Value Ref Range    Vit D, 25-Hydroxy 13.6 (L) 30 - 100 ng/mL         Signed By: Alex Cifuentes MD

## 2025-03-20 NOTE — PROGRESS NOTES
ARU/IPR REFERRAL CONTACT NOTE  [facility]      Thank you for the opportunity to review this patient's case for admission to [facility].    Referral received: 3/20/2025 time:11:00am    Based on our pre-admission screening:                          [x ] Our Team/Medical Director is following this case.   Comments: Referral received from care manager. Will review with team.       Again, Thank you for this referral. Should you have any questions please do not hesitate to call.     Sincerely,  JESUS Handy, CTRS      Clinical Liaison  [facility]  [phone]

## 2025-03-20 NOTE — PLAN OF CARE
Problem: Safety - Medical Restraint  Goal: Remains free of injury from restraints (Restraint for Interference with Medical Device)  Description: INTERVENTIONS:  1. Determine that other, less restrictive measures have been tried or would not be effective before applying the restraint  2. Evaluate the patient's condition at the time of restraint application  3. Inform patient/family regarding the reason for restraint  4. Q2H: Monitor safety, psychosocial status, comfort, nutrition and hydration  3/20/2025 0748 by Lambert Tao, RN  Outcome: Progressing     Problem: Discharge Planning  Goal: Discharge to home or other facility with appropriate resources  3/20/2025 1117 by Shereen Berger, RN  Outcome: Progressing  Flowsheets  Taken 3/20/2025 1117  Discharge to home or other facility with appropriate resources:   Identify discharge learning needs (meds, wound care, etc)   Arrange for needed discharge resources and transportation as appropriate   Identify barriers to discharge with patient and caregiver  Taken 3/20/2025 0800  Discharge to home or other facility with appropriate resources:   Identify barriers to discharge with patient and caregiver   Identify discharge learning needs (meds, wound care, etc)   Arrange for interpreters to assist at discharge as needed  Note: Identify the barriers for discharge and address them . Provide education regarding pt diet and labs to follow up.   3/20/2025 0748 by Lambert Tao, RN  Outcome: Progressing  Flowsheets (Taken 3/19/2025 2000)  Discharge to home or other facility with appropriate resources:   Arrange for interpreters to assist at discharge as needed   Arrange for needed discharge resources and transportation as appropriate   Identify barriers to discharge with patient and caregiver   Identify discharge learning needs (meds, wound care, etc)   Refer to discharge planning if patient needs post-hospital services based on physician order or complex needs related to  safety  and administer oxygen as ordered   Monitor patient for seizure activity, document and report duration and description of seizure to Licensed Independent Practitioner   Reorient patient post seizure   Seizure pads on all 4 side rails     Problem: Skin/Tissue Integrity  Goal: Skin integrity remains intact  Description: 1.  Monitor for areas of redness and/or skin breakdown  2.  Assess vascular access sites hourly  3.  Every 4-6 hours minimum:  Change oxygen saturation probe site  4.  Every 4-6 hours:  If on nasal continuous positive airway pressure, respiratory therapy assess nares and determine need for appliance change or resting period  Recent Flowsheet Documentation  Taken 3/20/2025 0800 by Shereen Berger RN  Skin Integrity Remains Intact:   Monitor for areas of redness and/or skin breakdown   Assess vascular access sites hourly   Every 4-6 hours minimum: Change oxygen saturation probe site  3/20/2025 0748 by Lambert Tao RN  Outcome: Progressing  Flowsheets (Taken 3/19/2025 2000)  Skin Integrity Remains Intact:   Assess vascular access sites hourly   Every 4-6 hours: If on nasal continuous positive airway pressure, respiratory therapy assesses nares and determine need for appliance change or resting period   Every 4-6 hours minimum: Change oxygen saturation probe site   Monitor for areas of redness and/or skin breakdown     Problem: Nutrition Deficit:  Goal: Optimize nutritional status  Recent Flowsheet Documentation  Taken 3/20/2025 0918 by Dockweiler, Megan, RD  Nutrient intake appropriate for improving, restoring, or maintaining nutritional needs:   Assess nutritional status and recommend course of action   Monitor oral intake, labs, and treatment plans   Recommend appropriate diets, oral nutritional supplements, and vitamin/mineral supplements  3/20/2025 0748 by Lambert Tao RN  Outcome: Progressing

## 2025-03-20 NOTE — PLAN OF CARE
Problem: Safety - Medical Restraint  Goal: Remains free of injury from restraints (Restraint for Interference with Medical Device)  Description: INTERVENTIONS:  1. Determine that other, less restrictive measures have been tried or would not be effective before applying the restraint  2. Evaluate the patient's condition at the time of restraint application  3. Inform patient/family regarding the reason for restraint  4. Q2H: Monitor safety, psychosocial status, comfort, nutrition and hydration  Outcome: Progressing     Problem: Discharge Planning  Goal: Discharge to home or other facility with appropriate resources  Outcome: Progressing  Flowsheets (Taken 3/19/2025 2000)  Discharge to home or other facility with appropriate resources:   Arrange for interpreters to assist at discharge as needed   Arrange for needed discharge resources and transportation as appropriate   Identify barriers to discharge with patient and caregiver   Identify discharge learning needs (meds, wound care, etc)   Refer to discharge planning if patient needs post-hospital services based on physician order or complex needs related to functional status, cognitive ability or social support system     Problem: Pain  Goal: Verbalizes/displays adequate comfort level or baseline comfort level  Outcome: Progressing     Problem: Safety - Adult  Goal: Free from fall injury  Outcome: Progressing     Problem: Neurosensory - Adult  Goal: Absence of seizures  Outcome: Progressing  Flowsheets (Taken 3/19/2025 2000)  Absence of seizures:   Administer anticonvulsants as ordered   Diagnostic studies as ordered   If seizure occurs, turn head to side and suction secretions as needed   Monitor for seizure activity.  If seizure occurs, document type and location of movements and any associated apnea   Support airway/breathing, administer oxygen as needed  Goal: Remains free of injury related to seizures activity  Outcome: Progressing  Flowsheets (Taken 3/19/2025  2000)  Remains free of injury related to seizure activity:   If seizure occurs, turn patient to side and suction secretions as needed   Instruct patient/family to call for assistance with activity based on assessment   Instruct patient/family to notify RN of any seizure activity   Maintain airway, patient safety  and administer oxygen as ordered   Monitor patient for seizure activity, document and report duration and description of seizure to Licensed Independent Practitioner   Reorient patient post seizure   Seizure pads on all 4 side rails     Problem: Skin/Tissue Integrity  Goal: Skin integrity remains intact  Description: 1.  Monitor for areas of redness and/or skin breakdown  2.  Assess vascular access sites hourly  3.  Every 4-6 hours minimum:  Change oxygen saturation probe site  4.  Every 4-6 hours:  If on nasal continuous positive airway pressure, respiratory therapy assess nares and determine need for appliance change or resting period  Outcome: Progressing  Flowsheets (Taken 3/19/2025 2000)  Skin Integrity Remains Intact:   Assess vascular access sites hourly   Every 4-6 hours: If on nasal continuous positive airway pressure, respiratory therapy assesses nares and determine need for appliance change or resting period   Every 4-6 hours minimum: Change oxygen saturation probe site   Monitor for areas of redness and/or skin breakdown     Problem: Nutrition Deficit:  Goal: Optimize nutritional status  Outcome: Progressing

## 2025-03-21 VITALS
DIASTOLIC BLOOD PRESSURE: 74 MMHG | TEMPERATURE: 98.4 F | HEART RATE: 54 BPM | BODY MASS INDEX: 28.96 KG/M2 | RESPIRATION RATE: 15 BRPM | OXYGEN SATURATION: 100 % | SYSTOLIC BLOOD PRESSURE: 112 MMHG | HEIGHT: 72 IN | WEIGHT: 213.85 LBS

## 2025-03-21 LAB
ANION GAP SERPL CALC-SCNC: 4 MMOL/L (ref 3–18)
BASOPHILS # BLD: 0.03 K/UL (ref 0–0.1)
BASOPHILS NFR BLD: 0.6 % (ref 0–2)
BUN SERPL-MCNC: 10 MG/DL (ref 7–18)
BUN/CREAT SERPL: 10 (ref 12–20)
CALCIUM SERPL-MCNC: 8.6 MG/DL (ref 8.5–10.1)
CHLORIDE SERPL-SCNC: 110 MMOL/L (ref 100–111)
CO2 SERPL-SCNC: 25 MMOL/L (ref 21–32)
CREAT SERPL-MCNC: 1 MG/DL (ref 0.6–1.3)
DIFFERENTIAL METHOD BLD: ABNORMAL
EOSINOPHIL # BLD: 0.21 K/UL (ref 0–0.4)
EOSINOPHIL NFR BLD: 4 % (ref 0–5)
ERYTHROCYTE [DISTWIDTH] IN BLOOD BY AUTOMATED COUNT: 14.4 % (ref 11.6–14.5)
GLUCOSE SERPL-MCNC: 99 MG/DL (ref 74–99)
HCT VFR BLD AUTO: 33.1 % (ref 35–45)
HGB BLD-MCNC: 10.6 G/DL (ref 12–16)
IMM GRANULOCYTES # BLD AUTO: 0.01 K/UL (ref 0–0.04)
IMM GRANULOCYTES NFR BLD AUTO: 0.2 % (ref 0–0.5)
LYMPHOCYTES # BLD: 1.28 K/UL (ref 0.9–3.6)
LYMPHOCYTES NFR BLD: 24.4 % (ref 21–52)
MAGNESIUM SERPL-MCNC: 1.7 MG/DL (ref 1.6–2.6)
MCH RBC QN AUTO: 25.4 PG (ref 24–34)
MCHC RBC AUTO-ENTMCNC: 32 G/DL (ref 31–37)
MCV RBC AUTO: 79.4 FL (ref 78–100)
MONOCYTES # BLD: 0.57 K/UL (ref 0.05–1.2)
MONOCYTES NFR BLD: 10.9 % (ref 3–10)
NEUTS SEG # BLD: 3.15 K/UL (ref 1.8–8)
NEUTS SEG NFR BLD: 59.9 % (ref 40–73)
NRBC # BLD: 0 K/UL (ref 0–0.01)
NRBC BLD-RTO: 0 PER 100 WBC
PHOSPHATE SERPL-MCNC: 4 MG/DL (ref 2.5–4.9)
PLATELET # BLD AUTO: 257 K/UL (ref 135–420)
PMV BLD AUTO: 10.9 FL (ref 9.2–11.8)
POTASSIUM SERPL-SCNC: 3.6 MMOL/L (ref 3.5–5.5)
RBC # BLD AUTO: 4.17 M/UL (ref 4.2–5.3)
SODIUM SERPL-SCNC: 139 MMOL/L (ref 136–145)
WBC # BLD AUTO: 5.3 K/UL (ref 4.6–13.2)

## 2025-03-21 PROCEDURE — 85025 COMPLETE CBC W/AUTO DIFF WBC: CPT

## 2025-03-21 PROCEDURE — 84100 ASSAY OF PHOSPHORUS: CPT

## 2025-03-21 PROCEDURE — 80048 BASIC METABOLIC PNL TOTAL CA: CPT

## 2025-03-21 PROCEDURE — 94761 N-INVAS EAR/PLS OXIMETRY MLT: CPT

## 2025-03-21 PROCEDURE — 83735 ASSAY OF MAGNESIUM: CPT

## 2025-03-21 PROCEDURE — 2500000003 HC RX 250 WO HCPCS

## 2025-03-21 PROCEDURE — 99239 HOSP IP/OBS DSCHRG MGMT >30: CPT | Performed by: FAMILY MEDICINE

## 2025-03-21 PROCEDURE — 6370000000 HC RX 637 (ALT 250 FOR IP): Performed by: FAMILY MEDICINE

## 2025-03-21 PROCEDURE — 97535 SELF CARE MNGMENT TRAINING: CPT

## 2025-03-21 PROCEDURE — 97116 GAIT TRAINING THERAPY: CPT

## 2025-03-21 PROCEDURE — 36415 COLL VENOUS BLD VENIPUNCTURE: CPT

## 2025-03-21 RX ORDER — VITAMIN B COMPLEX
1000 TABLET ORAL DAILY
Qty: 30 TABLET | Refills: 1 | Status: SHIPPED | OUTPATIENT
Start: 2025-03-21

## 2025-03-21 RX ORDER — SERTRALINE HYDROCHLORIDE 25 MG/1
25 TABLET, FILM COATED ORAL DAILY
Qty: 30 TABLET | Refills: 1 | Status: SHIPPED | OUTPATIENT
Start: 2025-03-21

## 2025-03-21 RX ADMIN — Medication 1000 UNITS: at 09:40

## 2025-03-21 RX ADMIN — SODIUM CHLORIDE, PRESERVATIVE FREE 10 ML: 5 INJECTION INTRAVENOUS at 09:40

## 2025-03-21 RX ADMIN — SERTRALINE HYDROCHLORIDE 25 MG: 50 TABLET ORAL at 09:40

## 2025-03-21 ASSESSMENT — PAIN SCALES - GENERAL
PAINLEVEL_OUTOF10: 0

## 2025-03-21 ASSESSMENT — PAIN SCALES - WONG BAKER
WONGBAKER_NUMERICALRESPONSE: NO HURT

## 2025-03-21 NOTE — CARE COORDINATION
D/C order noted for today. Orders reviewed. CM will provide pt with outpatient resources. CM remains available if needed. Mirna Tao  [phone].

## 2025-03-21 NOTE — PLAN OF CARE
Problem: Occupational Therapy - Adult  Goal: By Discharge: Performs self-care activities at highest level of function for planned discharge setting.  See evaluation for individualized goals.  Description: Occupational Therapy Goals:  Initiated 3/19/2025 to be met within 7-10 days.    1.  Patient will perform grooming with supervision/set-up while standing at the sink for > 2 min with Good balance.   2.  Patient will perform bathing with supervision/set-up.  3.  Patient will perform lower body dressing with supervision/set-up.  4.  Patient will perform toilet transfers with supervision/set-up.  5.  Patient will perform all aspects of toileting with supervision/set-up.  6.  Patient will participate in upper extremity therapeutic exercise/activities with supervision/set-up for 8 minutes to improve endurance and UB strength needed for ADLs    7.  Patient will utilize energy conservation techniques during functional activities with verbal cues.    PLOF: Pt lives with family on 3rd floor of [address], independent with ADLs and functional mobility. Plays professional basketball.    Outcome: Adequate for Discharge  OCCUPATIONAL THERAPY TREATMENT/DISCHARGE    Patient: Katherine Hendrix (29 y.o. female)  Date: 3/21/2025  Diagnosis: Status epilepticus (HCC) [G40.901] Status epilepticus (HCC)      Precautions: Fall Risk    Chart, occupational therapy assessment, plan of care, and goals were reviewed.  ASSESSMENT:    Pt is in good spirits and demos resolution of all previous deficits. Pt performed UB/LB ADLs with Mod Ind due to ICU lines. Pt demos Good balance with functional mobility and standing ADLs. No further OT is indicated.     PLAN:  Maximum therapeutic gains met at current level of care and patient will be discharged from occupational therapy at this time.  Rationale for discharge:  [x] Goals Achieved  [] Plateau Reached  [] Patient not participating in therapy  [] Other:    Further Equipment Recommendations for Discharge:  None    AMPA: AM-PAC Inpatient Daily Activity Raw Score: 24    At this time and based on an AM-PAC score, no further OT is recommended upon discharge.  Recommend patient returns to prior setting with prior services.    This [facility] score should be considered in conjunction with interdisciplinary team recommendations to determine the most appropriate discharge setting. Patient's social support, diagnosis, medical stability, and prior level of function should also be taken into consideration.     SUBJECTIVE:   Patient stated, \"Can I go to the gym.\"    OBJECTIVE DATA SUMMARY:   Cognitive/Behavioral Status:  Orientation  Overall Orientation Status: Within Normal Limits  Orientation Level: Oriented X4  Cognition  Overall Cognitive Status: WNL    Functional Mobility and Transfers for ADLs:   Bed Mobility:  Bed Mobility Training  Supine to Sit: Independent  Sit to Supine: Independent   Transfers:  Transfer Training  Transfer Training: Yes  Sit to Stand: Independent  Stand to Sit: Independent    Balance:  Balance  Sitting: Intact  Standing: Intact    ADL Intervention:     Grooming: Independent  UE Bathing: Independent  LE Bathing: Independent  UE Dressing: Independent  LE Dressing: Modified independent   Toileting: Modified independent   Functional Mobility: Modified independent      Pain:  Intensity Pre-treatment: 0/10   Intensity Post-treatment: 0/10  Scale: Numeric Rating Scale    Activity Tolerance:    Activity Tolerance: Patient tolerated treatment well  Please refer to the flowsheet for vital signs taken during this treatment.  After treatment:   []  Patient left in no apparent distress sitting up in chair  [x]  Patient left in no apparent distress in bed  [x]  Call bell left within reach  [x]  Nursing notified  []  Caregiver present  []  Bed/chair alarm activated  [x]  No alarm pt is independent    COMMUNICATION/EDUCATION:   Patient Education  Education Given To: Patient  Education Provided: Role of Therapy;Plan of

## 2025-03-21 NOTE — PLAN OF CARE
Problem: Physical Therapy - Adult  Goal: By Discharge: Performs mobility at highest level of function for planned discharge setting.  See evaluation for individualized goals.  Description: Physical Therapy Goals  Initiated 3/19/2025 and to be accomplished within 7 day(s)  1.  Patient will move from supine to sit and sit to supine in bed with modified independence in preparation for seated tasks.    2.  Patient will transfer from bed to chair and chair to bed with modified independence using the least restrictive device in preparation for out of bed.  3.  Patient will perform sit to stand with modified independence in preparation for out of bed tasks.  4.  Patient will ambulate with modified independence for 50 feet with the least restrictive device in preparation for home setting.   5.  Patient will ascend/descend 8 stairs with handrail(s) with supervision/set-up in preparation for negotiation of home set-up.    PLOF: Lives with family in 3 story home with bedroom/bathroom on 3rd floor. Independent.   Outcome: Completed   PHYSICAL THERAPY TREATMENT/DISCHARGE    Patient: Katherine Hendrix (29 y.o. female)  Date: 3/21/2025  Diagnosis: Status epilepticus (HCC) [G40.901] Status epilepticus (HCC)  Precautions: Fall Risk  ASSESSMENT:  Reports up amb and washed up in bathroom. Reports prior mobility deficits have resolved. Independent in transfers and amb. Steady gait. Denies mobility concerns with return home. Returned to seated in bed. Call bell in reach.     Further skilled physical therapy is not indicated at this time.     PLAN:  Maximum therapeutic gains met at current level of care and patient will be discharged from physical therapy at this time.  Rationale for discharge:  [x]     Goals Achieved  []     Plateau Reached  []     Patient not participating in therapy  []     Other:    Further Equipment Recommendations for Discharge: none    AM-PAC Inpatient Mobility Raw Score : 24     This [facility] score should be considered

## 2025-03-23 LAB
BACTERIA SPEC CULT: NORMAL
BACTERIA SPEC CULT: NORMAL
SERVICE CMNT-IMP: NORMAL
SERVICE CMNT-IMP: NORMAL

## 2025-03-24 NOTE — PROGRESS NOTES
EEG Session Report  Patient Name: FOX CONTRERAS  Medical ID: [id number]  YOB: 1995  Age: 29  Session Duration:  Mar 17, 2025 11:55 PM - Mar 18, 2025 12:24 AM  Recording Total Time: 00:29:04  Ordering Physician: MARY JANE  Primary Indication: Prior Seizure  Location: ED  Impressions:   Comments:   Report prepared by: N/A  Report generated on: Mar 24, 2025 12:34 AM [facility]    Technical Description: Ceribell is an FDA?approved EEG that can be used by staff members providing care to patients with a suspicion of seizure. This device is a reduced?montage (8?channel) circumferential EEG that produces tracings covering the frontal, temporal, and occipital areas.         Impression: Normal study

## 2025-03-24 NOTE — PROGRESS NOTES
Technical Description: Ceribell is an FDA?approved EEG that can be used by staff members providing care to patients with a suspicion of seizure. This device is a reduced?montage (8?channel) circumferential EEG that produces tracings covering the frontal, temporal, and occipital areas.     EEG Session Report  Patient Name: FOX CONTRERAS  Medical ID: [id number]  YOB: 1995  Age: 29  Session Duration:  Mar 18, 2025 2:13 PM - Mar 18, 2025 6:45 PM  Recording Total Time: 04:31:56  Ordering Physician: NICOLE  Primary Indication: Prior Seizure  Location: ICU/MICU  Impressions:   Comments:   Report prepared by: N/A  Report generated on: Mar 24, 2025 12:22 AM [facility]-4    EEG Session Report  Patient Name: FOX CONTRERAS  Medical ID: [id number]  YOB: 1995  Age: 29  Session Duration:  Mar 18, 2025 6:57 AM - Mar 18, 2025 10:04 AM  Recording Total Time: 03:07:19  Ordering Physician: BETO  Primary Indication: Prior Seizure  Location: ICU/MICU  Impressions:   Comments:   Report prepared by: N/A  Report generated on: Mar 24, 2025 12:27 AM [facility]-4    EEG Session Report  Patient Name: FOX CONTRERAS  Medical ID: [id number]  YOB: 1995  Age: 29  Session Duration:  Mar 18, 2025 1:01 AM - Mar 18, 2025 6:21 AM  Recording Total Time: 05:19:52  Ordering Physician: YEE CALVIN  Primary Indication: Prior Seizure  Location: ICU/MICU  Impressions:   Comments:   Report prepared by: N/A  Report generated on: Mar 24, 2025 12:29 AM [facility]-4    Findings: No electrographic seizures. The patient had a typical clinical event of eyes rolling upwards and jerking movements in the extremities without change in the electrographic background consistent with psychogenic non epileptiform seizures.

## 2025-03-31 NOTE — DISCHARGE SUMMARY
process.        Electronically signed by Rosa Knutson        Exam Ended: 03/18/25 05:40 EDT Last Resulted: 03/18/25 08:21 EDT               Discharge Medications:     Discharge Medication List as of 3/21/2025 10:54 AM        START taking these medications    Details   sertraline (ZOLOFT) 25 MG tablet Take 1 tablet by mouth daily, Disp-30 tablet, R-1Normal      Vitamin D (CHOLECALCIFEROL) 25 MCG (1000 UT) TABS tablet Take 1 tablet by mouth daily, Disp-30 tablet, R-1Labeling may look different.  25 mcg=1000 Units. Please double check dosages.Normal           STOP taking these medications       NONFORMULARY Comments:   Reason for Stopping:         naproxen (NAPROSYN) 500 MG tablet Comments:   Reason for Stopping:               Activity: As tolerated.    Diet: Regular.    Disposition: Home.    Follow-up: with PCP in 1 week.    Minutes spent on discharge: >30 minutes spent coordinating this discharge.        Alex Cifuentes MD  [facility]  [facility]    Disclaimer: Sections of this note are dictated using utilizing voice recognition software. Minor typographical errors may be present. If questions arise, please do not hesitate to contact me or call our department.